# Patient Record
Sex: MALE | Race: BLACK OR AFRICAN AMERICAN | URBAN - METROPOLITAN AREA
[De-identification: names, ages, dates, MRNs, and addresses within clinical notes are randomized per-mention and may not be internally consistent; named-entity substitution may affect disease eponyms.]

---

## 2018-02-09 NOTE — PROGRESS NOTES
[facility]: JESSIKA NEGRO W  Acute Inpatient Rehab Preadmission Assessment    Patient Name: Adenike Morales        MRN: <[id number]>    : 1940  (78 y.o.)  Gender: male     Admitted from:   [facility]  [facility]   [x]Outside Admission - Location: [facility]                                 [x]Initial         []Updated    Date of admission to the acute hospital:  2018    Impairment group:  4.1 Nontraumatic Spinal Cord Dysfunction (cervical myelopathy)     Did patient have surgery? []No  [x]Yes: 18 and scheduled 18     Physicians: Tanya Valente MD    Risk for clinical complications:  DVT due to immobility, skin breakdown related to prolonged bedrest, neuromuscular difficulties, alterations and deficits to daily capabilities    Co-morbidities:  Chronic kidney disease stage III (believed to be diabetic nephropathy), diabetes mellitus type 2 controlled with complications, dyslipidemia, hypertension     Financial Information  Primary insurance:  [x]Medicare [] Medicare HMO  []Commercial insurance    []Medicaid   [] Medicaid HMO []Workers Compensation   []Personal Pay    Secondary Insurance:  []Medicare [] Medicare HMO  [x]Commercial insurance    []Medicaid   []Workers Compensation []None    Precautions:   []Cardiac Precautions  []Total hip precautions    [x] Cervical Spine Precautions  []Weight Bearing status:  [x]Safety Precautions/Concerns  []Visually impaired   []Hard of Hearing     Isolation Precautions:         []Yes  [x]No  If Yes:  [] Droplet  []Contact []Airborne     []VRE     []MRSA     []C-diff         [] TB       [] Other:        Physiatrist:  [x]         Patients Occupation: Retired    Reviewed Lab and Diagnostic reports from Current Admission: Yes    Patients Prior Functional  Level:   Left LE weakness at baseline since surgery in , L AFO.  Baseline Independent     History of current illness:  Retrieved from PM&R consult at [facility] [address] is a 69 year old male whose current [facility] [place] dates to 2/6/18. History notes that he was admitted on that date from home. Mr. Fabio Moody is being seen at the diagnosis of Cervical Myelopathy s/p C4-5, C5-6 ACDF on 2/6/18. TThe patient has a hx of Type II diabetes on oral medications with nephropathy with CKD, stage 3, hypertension, remote hx of possible stroke in 20s, without residual deficits, lumbar decompression surgery for LLE and foot drop in 2014 with Dr. Blanka Cardoza in [place], [place], peripheral neuropathy diagnosed by EMG in November 2016, spinal stenosis of lumbar region with neurogenic claudication, and cervical spondylosis with myelopathy who was seen in outpatient clinic. Work up included, \"cervical x-rays, mild DDD, lumbar xrays shows him leaning to the left, with multilevel DDD, no instability. Cervical MRI -shows C4-5, C5-6 severe stenosis. Lumbar MRI shows severe stenosis at L2-3. \" Trial of conservative treatments without relief, so surgery was recommended. He was admitted for planned surgery and on 2/6/18 underwent C4-5, C5-6 ACDF for cervical myelopathy. Post-op he reported \"mild odynophagia, but is swallowing thin liquids, solid food, and pills without much difficulty. \"    Prior to hospitalization, he relates a daily activity level that was fully independent at the community level with assistive devices, left AFO and cane. He lives with spouse in one level home with 4-5 steps to enter. Current functional status for upper extremity ADLs:    Feeding: Set up  Grooming: Set up  Bathing: Min  Dressing: Min    Current functional status for lower extremity ADLs:    Bathing: Max  Dressing: Max    Current functional status for bed, chair, wheelchair transfers:   Rolling: Min  Supine to Sit: Min  Scooting: CGA  Sit to Stand: CGA  Functional Mobility: Min with Rolling Walker     Current functional status for toileting:     Moderate assist (urinary incontinence)   Toilet transfer: CGA Current functional status for locomotion:    PT eval states: \"Requiring Min to Max A for functional tasks. Marching in place and unable to progress to full ambulation trail. \" Will need gait/ambulation evaluation upon arrival to ARU. Current functional status for comprehension: [facility]     Current functional status for expression: WFL     Current functional status for social interaction: [facility]     Current functional status for problem solving:  [facility]     Current functional status for memory: [facility]    Current Deficits R/T Impairment: Impairments to neuromuscular, swallowing, strength, endurance, adl's and self-care. Alterations and deficits to daily capabilities. Required Therapy:   [x] Physical Therapy  [x] Occupational Therapy   [x] Speech Therapy (swallowing difficulties)     Additional Services:  [x]   [x] Recreational Therapy  [x] Nutrition             [] Prosthetist/Orthotist  [] Dialysis  [] Other:     Rehab Justification:  Needs 3 hrs therapy per day or 15 hours per week:  Yes  Identified Rehab Nursing needs: Yes  Intense Interdisciplinary need:  Yes  Need for 24 hr physician supervision:  Yes  Measurable improved quality of life:  Yes  Willingness to participate:  Yes  Medical Necessity:  Yes  Patient able to tolerate care proposed:  Yes    Expected Discharge Destination/Functional Level:  Modified Independent      Expected length of time to achieve that level of improvement: 2 weeks  Expected Post Discharge Treatments: Home with Outpatient Therapy, 2nd surgery scheduled 2/28/18     Other information relevant to the care needs:  Patient requires 24 hour nursing and physician monitoring for DVT prophylaxis, skin breakdown, cervical spin precautions, safety, neuromuscular retraining, infection monitoring, pain management, and patient and family education. Acute Inpatient Rehabilitation Disclosure Statement provided to patient. Patient verbalized understanding.   Copy placed on patient's light chart. I have reviewed and concur with the findings and results of the pre-admission screening assessment completed by the Inpatient Rehabilitation Admissions Coordinator.

## 2018-02-11 ENCOUNTER — HOSPITAL ENCOUNTER (INPATIENT)
Age: 78
LOS: 10 days | Discharge: HOME | DRG: 949 | End: 2018-02-21
Attending: PHYSICAL MEDICINE & REHABILITATION | Admitting: PHYSICAL MEDICINE & REHABILITATION
Payer: MEDICARE

## 2018-02-11 DIAGNOSIS — Z98.890 H/O EXCISION OF LAMINA OF CERVICAL VERTEBRA FOR DECOMPRESSION OF SPINAL CORD: ICD-10-CM

## 2018-02-11 DIAGNOSIS — G95.9 CERVICAL MYELOPATHY (HCC): Primary | ICD-10-CM

## 2018-02-11 PROCEDURE — 6370000000 HC RX 637 (ALT 250 FOR IP): Performed by: PHYSICAL MEDICINE & REHABILITATION

## 2018-02-11 PROCEDURE — 1180000000 HC REHAB R&B

## 2018-02-11 RX ORDER — HYDRALAZINE HYDROCHLORIDE 100 MG/1
100 TABLET, FILM COATED ORAL 3 TIMES DAILY
Status: ON HOLD | COMMUNITY
End: 2018-02-20 | Stop reason: HOSPADM

## 2018-02-11 RX ORDER — CLONIDINE HYDROCHLORIDE 0.1 MG/1
0.1 TABLET ORAL 3 TIMES DAILY
Status: DISCONTINUED | OUTPATIENT
Start: 2018-02-11 | End: 2018-02-21 | Stop reason: HOSPADM

## 2018-02-11 RX ORDER — ACETAMINOPHEN 325 MG/1
650 TABLET ORAL EVERY 4 HOURS PRN
Status: DISCONTINUED | OUTPATIENT
Start: 2018-02-11 | End: 2018-02-21 | Stop reason: HOSPADM

## 2018-02-11 RX ORDER — LISINOPRIL 20 MG/1
20 TABLET ORAL 2 TIMES DAILY
Status: ON HOLD | COMMUNITY
End: 2018-02-20 | Stop reason: HOSPADM

## 2018-02-11 RX ORDER — LISINOPRIL 20 MG/1
20 TABLET ORAL 2 TIMES DAILY
Status: DISCONTINUED | OUTPATIENT
Start: 2018-02-11 | End: 2018-02-21 | Stop reason: HOSPADM

## 2018-02-11 RX ORDER — ASPIRIN 81 MG/1
81 TABLET, CHEWABLE ORAL DAILY
Status: DISCONTINUED | OUTPATIENT
Start: 2018-02-12 | End: 2018-02-21 | Stop reason: HOSPADM

## 2018-02-11 RX ORDER — SENNA PLUS 8.6 MG/1
2 TABLET ORAL NIGHTLY
Status: DISCONTINUED | OUTPATIENT
Start: 2018-02-12 | End: 2018-02-21 | Stop reason: HOSPADM

## 2018-02-11 RX ORDER — EZETIMIBE 10 MG/1
10 TABLET ORAL DAILY
COMMUNITY

## 2018-02-11 RX ORDER — CARVEDILOL 25 MG/1
25 TABLET ORAL 2 TIMES DAILY WITH MEALS
Status: DISCONTINUED | OUTPATIENT
Start: 2018-02-11 | End: 2018-02-21 | Stop reason: HOSPADM

## 2018-02-11 RX ORDER — SENNA PLUS 8.6 MG/1
2 TABLET ORAL DAILY
Status: ON HOLD | COMMUNITY
End: 2018-05-22 | Stop reason: HOSPADM

## 2018-02-11 RX ORDER — HYDRALAZINE HYDROCHLORIDE 50 MG/1
100 TABLET, FILM COATED ORAL EVERY 8 HOURS SCHEDULED
Status: DISCONTINUED | OUTPATIENT
Start: 2018-02-11 | End: 2018-02-21 | Stop reason: HOSPADM

## 2018-02-11 RX ORDER — OXYCODONE HYDROCHLORIDE AND ACETAMINOPHEN 5; 325 MG/1; MG/1
1 TABLET ORAL EVERY 6 HOURS PRN
Status: DISCONTINUED | OUTPATIENT
Start: 2018-02-11 | End: 2018-02-21 | Stop reason: HOSPADM

## 2018-02-11 RX ORDER — DOCUSATE SODIUM 100 MG/1
100 CAPSULE, LIQUID FILLED ORAL 2 TIMES DAILY
Status: ON HOLD | COMMUNITY
End: 2018-02-20 | Stop reason: HOSPADM

## 2018-02-11 RX ORDER — NICOTINE POLACRILEX 4 MG
15 LOZENGE BUCCAL PRN
Status: DISCONTINUED | OUTPATIENT
Start: 2018-02-11 | End: 2018-02-21 | Stop reason: HOSPADM

## 2018-02-11 RX ORDER — FUROSEMIDE 40 MG/1
40 TABLET ORAL DAILY
Status: ON HOLD | COMMUNITY
End: 2018-02-20 | Stop reason: HOSPADM

## 2018-02-11 RX ORDER — DOCUSATE SODIUM 100 MG/1
100 CAPSULE, LIQUID FILLED ORAL 2 TIMES DAILY
Status: DISCONTINUED | OUTPATIENT
Start: 2018-02-12 | End: 2018-02-21 | Stop reason: HOSPADM

## 2018-02-11 RX ORDER — FUROSEMIDE 40 MG/1
40 TABLET ORAL DAILY
Status: DISCONTINUED | OUTPATIENT
Start: 2018-02-12 | End: 2018-02-21 | Stop reason: HOSPADM

## 2018-02-11 RX ORDER — M-VIT,TX,IRON,MINS/CALC/FOLIC 27MG-0.4MG
1 TABLET ORAL DAILY
COMMUNITY

## 2018-02-11 RX ORDER — ASCORBIC ACID 500 MG
1000 TABLET ORAL DAILY
Status: DISCONTINUED | OUTPATIENT
Start: 2018-02-12 | End: 2018-02-21 | Stop reason: HOSPADM

## 2018-02-11 RX ORDER — FEBUXOSTAT 40 MG/1
40 TABLET, FILM COATED ORAL DAILY
Status: DISCONTINUED | OUTPATIENT
Start: 2018-02-12 | End: 2018-02-21 | Stop reason: HOSPADM

## 2018-02-11 RX ORDER — ATORVASTATIN CALCIUM 40 MG/1
40 TABLET, FILM COATED ORAL NIGHTLY
Status: DISCONTINUED | OUTPATIENT
Start: 2018-02-11 | End: 2018-02-21 | Stop reason: HOSPADM

## 2018-02-11 RX ORDER — ATORVASTATIN CALCIUM 40 MG/1
40 TABLET, FILM COATED ORAL DAILY
Status: ON HOLD | COMMUNITY
End: 2018-02-20 | Stop reason: HOSPADM

## 2018-02-11 RX ORDER — ASCORBIC ACID 500 MG
1000 TABLET ORAL DAILY
COMMUNITY

## 2018-02-11 RX ORDER — CLONIDINE HYDROCHLORIDE 0.1 MG/1
0.1 TABLET ORAL 3 TIMES DAILY
Status: ON HOLD | COMMUNITY
End: 2018-02-20 | Stop reason: HOSPADM

## 2018-02-11 RX ORDER — FEBUXOSTAT 40 MG/1
40 TABLET, FILM COATED ORAL DAILY
Status: ON HOLD | COMMUNITY
End: 2018-02-20 | Stop reason: HOSPADM

## 2018-02-11 RX ORDER — M-VIT,TX,IRON,MINS/CALC/FOLIC 27MG-0.4MG
1 TABLET ORAL DAILY
Status: DISCONTINUED | OUTPATIENT
Start: 2018-02-12 | End: 2018-02-21 | Stop reason: HOSPADM

## 2018-02-11 RX ORDER — CARVEDILOL 25 MG/1
25 TABLET ORAL 2 TIMES DAILY
Status: ON HOLD | COMMUNITY
End: 2018-02-20 | Stop reason: HOSPADM

## 2018-02-11 RX ORDER — HEPARIN SODIUM 5000 [USP'U]/ML
5000 INJECTION, SOLUTION INTRAVENOUS; SUBCUTANEOUS 2 TIMES DAILY
Status: DISCONTINUED | OUTPATIENT
Start: 2018-02-12 | End: 2018-02-21 | Stop reason: HOSPADM

## 2018-02-11 RX ORDER — HEPARIN SODIUM 5000 [USP'U]/ML
5000 INJECTION, SOLUTION INTRAVENOUS; SUBCUTANEOUS 2 TIMES DAILY
Status: ON HOLD | COMMUNITY
End: 2018-02-20 | Stop reason: HOSPADM

## 2018-02-11 RX ORDER — FAMOTIDINE 20 MG/1
20 TABLET, FILM COATED ORAL 2 TIMES DAILY
Status: DISCONTINUED | OUTPATIENT
Start: 2018-02-11 | End: 2018-02-15

## 2018-02-11 RX ORDER — DEXTROSE MONOHYDRATE 25 G/50ML
12.5 INJECTION, SOLUTION INTRAVENOUS PRN
Status: DISCONTINUED | OUTPATIENT
Start: 2018-02-11 | End: 2018-02-21 | Stop reason: HOSPADM

## 2018-02-11 RX ORDER — OXYCODONE HYDROCHLORIDE AND ACETAMINOPHEN 5; 325 MG/1; MG/1
1 TABLET ORAL EVERY 6 HOURS PRN
Status: ON HOLD | COMMUNITY
End: 2018-02-20 | Stop reason: HOSPADM

## 2018-02-11 RX ORDER — DEXTROSE MONOHYDRATE 50 MG/ML
100 INJECTION, SOLUTION INTRAVENOUS PRN
Status: DISCONTINUED | OUTPATIENT
Start: 2018-02-11 | End: 2018-02-21 | Stop reason: HOSPADM

## 2018-02-11 RX ADMIN — CLONIDINE HYDROCHLORIDE 0.1 MG: 0.1 TABLET ORAL at 21:10

## 2018-02-11 RX ADMIN — HYDRALAZINE HYDROCHLORIDE 100 MG: 50 TABLET, FILM COATED ORAL at 21:11

## 2018-02-11 RX ADMIN — FAMOTIDINE 20 MG: 20 TABLET, FILM COATED ORAL at 21:09

## 2018-02-11 RX ADMIN — LISINOPRIL 20 MG: 20 TABLET ORAL at 21:09

## 2018-02-11 RX ADMIN — CARVEDILOL 25 MG: 25 TABLET, FILM COATED ORAL at 21:13

## 2018-02-11 RX ADMIN — OXYCODONE HYDROCHLORIDE AND ACETAMINOPHEN 1 TABLET: 5; 325 TABLET ORAL at 21:09

## 2018-02-11 RX ADMIN — ATORVASTATIN CALCIUM 40 MG: 40 TABLET, FILM COATED ORAL at 21:09

## 2018-02-11 ASSESSMENT — PAIN DESCRIPTION - FREQUENCY: FREQUENCY: INTERMITTENT

## 2018-02-11 ASSESSMENT — PAIN DESCRIPTION - LOCATION: LOCATION: NECK

## 2018-02-11 ASSESSMENT — PAIN SCALES - GENERAL
PAINLEVEL_OUTOF10: 5
PAINLEVEL_OUTOF10: 5
PAINLEVEL_OUTOF10: 3

## 2018-02-11 ASSESSMENT — PAIN DESCRIPTION - PROGRESSION: CLINICAL_PROGRESSION: NOT CHANGED

## 2018-02-11 ASSESSMENT — PAIN DESCRIPTION - PAIN TYPE: TYPE: ACUTE PAIN;SURGICAL PAIN

## 2018-02-11 ASSESSMENT — PAIN DESCRIPTION - DESCRIPTORS: DESCRIPTORS: ACHING;DISCOMFORT

## 2018-02-11 ASSESSMENT — PAIN DESCRIPTION - ORIENTATION: ORIENTATION: ANTERIOR

## 2018-02-11 ASSESSMENT — PAIN DESCRIPTION - ONSET: ONSET: ON-GOING

## 2018-02-12 LAB
ALBUMIN SERPL-MCNC: 3.3 G/DL (ref 3.5–5.2)
ALBUMIN/GLOBULIN RATIO: ABNORMAL (ref 1–2.5)
ALP BLD-CCNC: 39 U/L (ref 40–129)
ALT SERPL-CCNC: 6 U/L (ref 5–41)
ANION GAP SERPL CALCULATED.3IONS-SCNC: 9 MMOL/L (ref 9–17)
AST SERPL-CCNC: 10 U/L
BILIRUB SERPL-MCNC: 0.25 MG/DL (ref 0.3–1.2)
BUN BLDV-MCNC: 29 MG/DL (ref 8–23)
BUN/CREAT BLD: ABNORMAL (ref 9–20)
CALCIUM SERPL-MCNC: 8.9 MG/DL (ref 8.6–10.4)
CHLORIDE BLD-SCNC: 104 MMOL/L (ref 98–107)
CO2: 31 MMOL/L (ref 20–31)
CREAT SERPL-MCNC: 1.73 MG/DL (ref 0.7–1.2)
GFR AFRICAN AMERICAN: 47 ML/MIN
GFR NON-AFRICAN AMERICAN: 39 ML/MIN
GFR SERPL CREATININE-BSD FRML MDRD: ABNORMAL ML/MIN/{1.73_M2}
GFR SERPL CREATININE-BSD FRML MDRD: ABNORMAL ML/MIN/{1.73_M2}
GLUCOSE BLD-MCNC: 105 MG/DL (ref 75–110)
GLUCOSE BLD-MCNC: 149 MG/DL (ref 75–110)
GLUCOSE BLD-MCNC: 152 MG/DL (ref 75–110)
GLUCOSE BLD-MCNC: 152 MG/DL (ref 75–110)
GLUCOSE BLD-MCNC: 156 MG/DL (ref 70–99)
POTASSIUM SERPL-SCNC: 3.6 MMOL/L (ref 3.7–5.3)
SODIUM BLD-SCNC: 144 MMOL/L (ref 135–144)
TOTAL PROTEIN: 6.3 G/DL (ref 6.4–8.3)

## 2018-02-12 PROCEDURE — 82947 ASSAY GLUCOSE BLOOD QUANT: CPT

## 2018-02-12 PROCEDURE — 36415 COLL VENOUS BLD VENIPUNCTURE: CPT

## 2018-02-12 PROCEDURE — 6370000000 HC RX 637 (ALT 250 FOR IP): Performed by: PHYSICAL MEDICINE & REHABILITATION

## 2018-02-12 PROCEDURE — 1180000000 HC REHAB R&B

## 2018-02-12 PROCEDURE — 99223 1ST HOSP IP/OBS HIGH 75: CPT | Performed by: PHYSICAL MEDICINE & REHABILITATION

## 2018-02-12 PROCEDURE — 97110 THERAPEUTIC EXERCISES: CPT

## 2018-02-12 PROCEDURE — 92610 EVALUATE SWALLOWING FUNCTION: CPT

## 2018-02-12 PROCEDURE — 80053 COMPREHEN METABOLIC PANEL: CPT

## 2018-02-12 PROCEDURE — 97116 GAIT TRAINING THERAPY: CPT

## 2018-02-12 PROCEDURE — 97162 PT EVAL MOD COMPLEX 30 MIN: CPT

## 2018-02-12 PROCEDURE — 97530 THERAPEUTIC ACTIVITIES: CPT

## 2018-02-12 PROCEDURE — 6360000002 HC RX W HCPCS: Performed by: PHYSICAL MEDICINE & REHABILITATION

## 2018-02-12 PROCEDURE — 97165 OT EVAL LOW COMPLEX 30 MIN: CPT

## 2018-02-12 PROCEDURE — 97535 SELF CARE MNGMENT TRAINING: CPT

## 2018-02-12 RX ORDER — POTASSIUM CHLORIDE 20 MEQ/1
20 TABLET, EXTENDED RELEASE ORAL ONCE
Status: COMPLETED | OUTPATIENT
Start: 2018-02-12 | End: 2018-02-12

## 2018-02-12 RX ADMIN — OXYCODONE HYDROCHLORIDE AND ACETAMINOPHEN 1000 MG: 500 TABLET ORAL at 07:37

## 2018-02-12 RX ADMIN — CARVEDILOL 25 MG: 25 TABLET, FILM COATED ORAL at 07:36

## 2018-02-12 RX ADMIN — SENNOSIDES 17.2 MG: 8.6 TABLET, FILM COATED ORAL at 21:07

## 2018-02-12 RX ADMIN — HEPARIN SODIUM 5000 UNITS: 5000 INJECTION, SOLUTION INTRAVENOUS; SUBCUTANEOUS at 07:38

## 2018-02-12 RX ADMIN — OXYCODONE HYDROCHLORIDE AND ACETAMINOPHEN 1 TABLET: 5; 325 TABLET ORAL at 06:16

## 2018-02-12 RX ADMIN — HYDRALAZINE HYDROCHLORIDE 100 MG: 50 TABLET, FILM COATED ORAL at 06:13

## 2018-02-12 RX ADMIN — HEPARIN SODIUM 5000 UNITS: 5000 INJECTION, SOLUTION INTRAVENOUS; SUBCUTANEOUS at 21:12

## 2018-02-12 RX ADMIN — CARVEDILOL 25 MG: 25 TABLET, FILM COATED ORAL at 18:08

## 2018-02-12 RX ADMIN — CLONIDINE HYDROCHLORIDE 0.1 MG: 0.1 TABLET ORAL at 15:22

## 2018-02-12 RX ADMIN — MULTIPLE VITAMINS W/ MINERALS TAB 1 TABLET: TAB at 07:36

## 2018-02-12 RX ADMIN — FAMOTIDINE 20 MG: 20 TABLET, FILM COATED ORAL at 21:07

## 2018-02-12 RX ADMIN — CLONIDINE HYDROCHLORIDE 0.1 MG: 0.1 TABLET ORAL at 07:37

## 2018-02-12 RX ADMIN — MAGNESIUM HYDROXIDE 30 ML: 400 SUSPENSION ORAL at 21:06

## 2018-02-12 RX ADMIN — OXYCODONE HYDROCHLORIDE AND ACETAMINOPHEN 1 TABLET: 5; 325 TABLET ORAL at 12:56

## 2018-02-12 RX ADMIN — ATORVASTATIN CALCIUM 40 MG: 40 TABLET, FILM COATED ORAL at 21:06

## 2018-02-12 RX ADMIN — INSULIN LISPRO 1 UNITS: 100 INJECTION, SOLUTION INTRAVENOUS; SUBCUTANEOUS at 21:09

## 2018-02-12 RX ADMIN — FAMOTIDINE 20 MG: 20 TABLET, FILM COATED ORAL at 07:36

## 2018-02-12 RX ADMIN — POTASSIUM CHLORIDE 20 MEQ: 20 TABLET, EXTENDED RELEASE ORAL at 18:09

## 2018-02-12 RX ADMIN — LISINOPRIL 20 MG: 20 TABLET ORAL at 21:06

## 2018-02-12 RX ADMIN — FEBUXOSTAT 40 MG: 40 TABLET ORAL at 07:37

## 2018-02-12 RX ADMIN — FUROSEMIDE 40 MG: 40 TABLET ORAL at 07:35

## 2018-02-12 RX ADMIN — LISINOPRIL 20 MG: 20 TABLET ORAL at 07:36

## 2018-02-12 RX ADMIN — CLONIDINE HYDROCHLORIDE 0.1 MG: 0.1 TABLET ORAL at 21:08

## 2018-02-12 RX ADMIN — HYDRALAZINE HYDROCHLORIDE 100 MG: 50 TABLET, FILM COATED ORAL at 15:22

## 2018-02-12 RX ADMIN — ASPIRIN 81 MG 81 MG: 81 TABLET ORAL at 07:36

## 2018-02-12 RX ADMIN — DOCUSATE SODIUM 100 MG: 100 CAPSULE, LIQUID FILLED ORAL at 21:07

## 2018-02-12 RX ADMIN — HYDRALAZINE HYDROCHLORIDE 100 MG: 50 TABLET, FILM COATED ORAL at 22:29

## 2018-02-12 RX ADMIN — OXYCODONE HYDROCHLORIDE AND ACETAMINOPHEN 1 TABLET: 5; 325 TABLET ORAL at 19:24

## 2018-02-12 RX ADMIN — LINAGLIPTIN 5 MG: 5 TABLET, FILM COATED ORAL at 07:38

## 2018-02-12 ASSESSMENT — PAIN SCALES - GENERAL
PAINLEVEL_OUTOF10: 4
PAINLEVEL_OUTOF10: 5
PAINLEVEL_OUTOF10: 0
PAINLEVEL_OUTOF10: 4
PAINLEVEL_OUTOF10: 5
PAINLEVEL_OUTOF10: 0
PAINLEVEL_OUTOF10: 5
PAINLEVEL_OUTOF10: 2
PAINLEVEL_OUTOF10: 6
PAINLEVEL_OUTOF10: 4
PAINLEVEL_OUTOF10: 6

## 2018-02-12 ASSESSMENT — PAIN DESCRIPTION - PAIN TYPE
TYPE: ACUTE PAIN;SURGICAL PAIN
TYPE: SURGICAL PAIN

## 2018-02-12 ASSESSMENT — PAIN - FUNCTIONAL ASSESSMENT: PAIN_FUNCTIONAL_ASSESSMENT: 0-10

## 2018-02-12 ASSESSMENT — PAIN DESCRIPTION - LOCATION
LOCATION: NECK
LOCATION: NECK
LOCATION: NECK;SHOULDER

## 2018-02-12 ASSESSMENT — PAIN DESCRIPTION - FREQUENCY: FREQUENCY: INTERMITTENT

## 2018-02-12 ASSESSMENT — PAIN DESCRIPTION - DESCRIPTORS
DESCRIPTORS: ACHING;DISCOMFORT
DESCRIPTORS: ACHING;DISCOMFORT

## 2018-02-12 ASSESSMENT — PAIN DESCRIPTION - ORIENTATION
ORIENTATION: ANTERIOR;RIGHT
ORIENTATION: ANTERIOR
ORIENTATION: ANTERIOR;RIGHT;LEFT

## 2018-02-12 ASSESSMENT — PAIN DESCRIPTION - PROGRESSION: CLINICAL_PROGRESSION: NOT CHANGED

## 2018-02-12 ASSESSMENT — PAIN DESCRIPTION - ONSET: ONSET: ON-GOING

## 2018-02-12 NOTE — PROGRESS NOTES
[facility]: JESSIKA ARAUJO   ACUTE REHABILITATION OCCUPATIONAL THERAPY  DAILY NOTE    Date: 18  Patient Name: Shakila Perez      Room: 7111/9042-41    MRN: [id number]   : 1940  (69 y.o.)  Gender: male      Diagnosis: Cervical Myelopathy s/pC4-C5, C5-6 ACDF on 18  Additional Pertinent Hx: h/o CVA    Restrictions  Restrictions/Precautions: General Precautions  Other position/activity restrictions: 18 underwent C4-5, C5-6 ACDF for cervical myelopathy  Required Braces or Orthoses  Left Lower Extremity Brace: [employer]  Position Activity Restriction  Other position/activity restrictions: 18 underwent C4-5, C5-6 ACDF for cervical myelopathy     Subjective  Comments: Pt pleasant and cooperative. Patient Currently in Pain: Yes  Pain Level: 6 (pt reports taking a pain pill recently )  Pain Location: Neck; Shoulder  Pain Orientation: Anterior;Right;Left  Restrictions/Precautions: General Precautions        Objective  Cognition  Overall Cognitive Status: WNL  Perception  Overall Perceptual Status: WFL  Balance  Sitting Balance: Independent  Standing Balance: Stand by assistance  Transfers  Sit to stand: Minimal assistance  Stand to sit: Contact guard assistance (VC's for hand placement)  Standing Balance  Time: 8-9 min x 2   Activity: standing at tabletop with 1 UE support while crossing midline to complete large ROM arch. Task faciliated pt to complete fully upright stand while addressing standing tolerance and BUE ROM (shoulders did not go past 90 degrees for comfort)  Sit to stand: Minimal assistance  Stand to sit: Contact guard assistance (VC's for hand placement)     OT FIM:   Dressing-Lower: 5 - Requires setup/supervision/cues and/or staff applies TEDS/prosthesis/brace only (doffed/donned B shoes/socks/L AFO)       Assessment  Performance deficits / Impairments: Decreased functional mobility ; Decreased ADL status; Decreased balance;Decreased endurance;Decreased high-level

## 2018-02-12 NOTE — PATIENT CARE CONFERENCE
[address]   ACUTE REHABILITATION  TEAM CONFERENCE NOTE  Date: 2/15/18  Patient Name: Daysi Najera       Room: 4405/0585-41  MRN: [id number]       : 1940  (69 y.o.)     Gender: male      Diagnosis: 18 underwent C4-5, C5-6 ACDF for cervical myelopathy    NURSING  FIMS:  Bladder: 7 - Patient urinates in toilet independently  Bladder Level of Assistance: 7- Complete [place]  Bladder Frequency of Accidents: 7 - No accidents  Bowel: 6 - Uses toilet independently with device or oral medication(s)  Bowel Level of Assistance: 6- Modified [place]  Bowel Frequency of Accidents: 6 - No accidents: uses device   Bladder  Continent  Bowel   Continent  Intervention    Bowel Program    Wounds/Incisions/Ulcers: Incision healing well  Medication Education Program: Patient able to manage medications and being educated by nursing  Pain: Patient's pain is currently controlled with -      Fall Risk:  Falling star program initiated    PHYSICAL THERAPY   Bed mobility  Bridging: Supervision  Scooting: Supervision  Bed Mobility  Bridging: Supervision  Supine to Sit: Supervision  Sit to Supine: Supervision  Scooting: Supervision      Transfers:  Sit to Stand: Contact guard assistance (vc's for hand placement)  Stand to sit: Contact guard assistance (cues for hand placement)  Stand Pivot Transfers: Contact guard assistance (w/RW)    WB Status: FWB  Ambulation 1  Surface: level tile  Device: Rolling Walker  Other Apparatus: AFO; Left  Assistance: Contact guard assistance  Quality of Gait: slow gait, fwd flexed posture, NBOS, tends to push RW too far fwd  Distance: 150 ft x 2  Comments: VC's to stay up inside the RW for safety    Stairs  # Steps : 5  Stairs Height: 4\" (6inch also)  Rails: Bilateral  Device: No Device  Assistance: Contact guard assistance  Comment: step to pattern on steps    FIMS:  Bed, Chair, Wheel Chair: 4 - Requires steadying assistance only <25% assist  and/or requires assist with one transfer using RW and toileting tasks with good . Short term goal 3: Pt. will demo. Mod.I with BADL's. Short term goal 4: Pt. will tolerate 30+ min. functional activities/therapeutic ex. to promote increased indep.with ADL's and mobility. Short term goal 5: Pt. will demo. SBA with light homemgmt tasks. Short term goal 6: Pt. will tolerate 8-10 min. functional standing activities to promote increased indep.with ADL's and mobility. SPEECH THERAPY  Tolerating regular solids, thin liquids. No ST recommended  Short Term Goal: n/a    RECREATIONAL THERAPY  Comment/Participation: Participating in unit program      NUTRITION  Weight: 213 lb (96.6 kg) / Body mass index is 31.45 kg/m². Diet Rx: CHO Control. Fair PO intake. Ensure High Protein provided BID to supplement intake. Although pt passesd bedside swallow study, he states some difficulty swallowing. Ground meats will be provided if requested. Ref. Range 2/15/2018 06:24 2/15/2018 07:01 2/15/2018 10:46   POC Glucose Latest Ref Range: 75 - 110 mg/dL 153 (H)  194 (H)   Please see nutrition note for details.     SOCIAL WORK ASSESSMENT  Assessment: with wife  Pre-Admission Status:  Lives With: Spouse  Type of Home: House  Home Layout: One level  Home Access: Stairs to enter with rails  Entrance Stairs - Number of Steps: 3  Entrance Stairs - Rails: Left  Bathroom Shower/Tub: Tub/Shower unit, Shower chair with back  Bathroom Toilet: Handicap height  Bathroom Equipment: Grab bars in shower  Home Equipment: Cane, Rolling walker, 4 wheeled walker  ADL Assistance: [address]: Needs assistance (shares with wife)  Homemaking Responsibilities: Yes  Meal Prep Responsibility: No (spouse does)  Laundry Responsibility: No (Spouse does)  Cleaning Responsibility: Secondary  Shopping Responsibility: Secondary  Ambulation Assistance: Independent (uses single cane and AFO left LE)  Transfer Assistance: Independent  Active : Yes  Mode of Transportation: Car  IADL Comments: Pt uses L AFO at all times, x years  Additional Comments: Limited distances with ambulation prior to surgery, used cane primarily, pushed cart or scooter in stores     Family Education: Need to make contact with family to initiate education    Risk for Readmission: Low <10   Readmission Risk              Readmission Risk:        9.5       Age 72 or Greater:  1    Admitted from SNF or Requires Paid or Family Care:  0    Currently has CHF,COPD,ARF,CRI,or is on dialysis:  0    Takes more than 5 Prescription Medications:  4    Takes Digoxin,Insulin,Anticoagulants,Narcotics or ASA/Plavix:  [address] in Past 12 Months:  0    On Disability:  0    Patient Considers own Health:  2.5        Critical Items:     Problem / Barrier Intervention / Plan  Results   Altered ability to care for self  Training in use of devices and modified techniques to increase safety and independence in self care tasks    Impaired mobility There exer; HEP; gait/stair training; pt educ                               Functional FIM Gain  Admission Score:  90  Progress:  TBD  Goal:  117   `  Discharge Plan   Estimated Discharge Date: 2/21/18  Overnight or Day Pass: No  Factors facilitating achievement of predicted outcomes: Family support, Motivated, Cooperative and Good insight into deficits  Barriers to the achievement of predicted outcomes: Pain, Anxiety, Upper extremity weakness, Lower extremity weakness, Medical complications, Skin Care and Medication managment    Functional Goals at discharge:  Home with family Modified independence  Discharge therapy goals:  PT: Long term goals  Time Frame for Long term goals : 10 days  Long term goal 1: Pt amb 300 ft with rw on uneven surfaces modified indep with proper postural awareness  Long term goal 2: Pt negotiate 12 stairs with left rail modified indep for community assess  OT:Long term goals  Time Frame for Long term goals : By discharge,   Long term goal 1:

## 2018-02-12 NOTE — PROGRESS NOTES
balance  Short term goal 3: Pt amb 200 ft with rw, modified indep using AFO, demonstrating safe HARLEY throughout  Short term goal 4: Pt improve standing balance to good with B UE support  Short term goal 5: Pt negotiate 4 stairs with 1 rail and CGA  Long term goals  Time Frame for Long term goals : 10 days  Long term goal 1: Pt amb 300 ft with rw on uneven surfaces modified indep with proper postural awareness  Long term goal 2: Pt negotiate 12 stairs with left rail modified indep for community assess  Patient Goals   Patient goals :  To get stronger       Therapy Time   Individual Concurrent Group Co-treatment   Time In [address] [place] [address]         [facility] Out [facility] [address] Yaa Hernandez

## 2018-02-12 NOTE — PROGRESS NOTES
Physical Therapy  [address]  Acute Rehabilitation Physical Therapy Progress Note    Date: 18  Patient Name: Getachew Cruz       Room: 87/4370-67  MRN: [id number]   Account: [de-identified]   : 1940  (78 y.o.) Gender: male           Past Medical History:  has a past medical history of Cerebral artery occlusion with cerebral infarction ([facility] [address] Chronic kidney disease; Diabetes mellitus ([facility] [address] Hyperlipidemia; Hypertension; and Movement disorder. Past Surgical History:   has a past surgical history that includes eye surgery (); hernia repair; Dilatation, esophagus (); back surgery (2018); and back surgery (). Overall Orientation Status: Within Normal Limits  Restrictions/Precautions  Restrictions/Precautions: General Precautions  Required Braces or Orthoses?: No  Position Activity Restriction  Other position/activity restrictions: 18 underwent C4-5, C5-6 ACDF for cervical myelopathy    Subjective: Pt reports he has neuropathy in (B) feet, partially from diabeties and partially from lumbar spine. Vital Signs  Patient Currently in Pain: Denies                   Bed Mobility:        Transfers:  Sit to Stand: Contact guard assistance (vc's for hand placement)  Stand to sit: Contact guard assistance (cues for hand placement)     Stand Pivot Transfers: Contact guard assistance (w/RW)           Ambulation 1  Surface: level tile  Device: Rolling Walker  Other Apparatus: AFO; Left  Assistance: Contact guard assistance  Quality of Gait: slow gait, fwd flexed posture, NBOS, tends to push RW too far fwd  Distance: 140ft  Comments: VC's to stay up inside the RW for safety        Stairs/Curb  Stairs?: Yes  Stairs  # Steps : 3  Stairs Height: 4\"  Rails: Bilateral  Assistance: Contact guard assistance  Comment: step to pattern on steps                                                    FIMS:      Transfers  Bed, Chair, Wheel Chair: 4 - Requires steadying assistance only

## 2018-02-12 NOTE — H&P
Physical Medicine & Rehabilitation History and Physical  [facility] Acute Rehabilitation Unit     Primary care provider: Ranjeet Cisneros MD     Chief Complaint and Reason for Rehabilitation Admission:   During ADL dysfunction secondary to cervical myelopathy, neurogenic claudication and neuropathy status post ACDF C4 through C6 once every 6 at [place]    History of Present Illness:  Marie Holland  is a 69 y.o. right-handed     male admitted to the [address] [place] [address] on 2/11/2018. He has a history of diabetes, diabetic neuropathy, history of back surgery done in 2014. After the back surgery he had improvement but then began with increasing weakness left greater than right. He uses an ankle-foot orthosis left lower extremity. He was referred to [place] for further evaluation. Questionable diabetic neuropathy, cervical myelopathy etc.  He underwent C4 5, C5 6 ACDF on Feb 6 2018 at [place]. Postop hypernatremia, anemia, mild renal insufficiency,patient make improvement and transferred to [facility] on February 11, 2018. Work-up included     Cervical xray Feb6: Post surgery  Status post discectomy and fusion with draining extending from C4 down to   C6 in good alignment.  No fracture.  Prevertebral soft tissue swelling is   present. Cervical MRI Nov 13 2017  *  Degenerative disc disease with midline disc protrusions at C4-5 and C5-6 resulting in moderate spinal stenoses and cord deformity. *  Uncovertebral degeneration and facet arthropathy result in significant narrowing of the bilateral C4-5 neural foramina. MRI lumbar spine November 11, 2017    Multilevel degenerative disc and facet disease with severe spinal stenosis at L2-3. *  Significant narrowing of the bilateral L3-4 and bilateral L4-5 neural foramina. Treatment included surgery and PT/OT.        He is currently requiring assistance for self-care activities Procedure Laterality Date    BACK [facility]06/2018    [facility]    BACK SURGERY  2014    [facility]  Lumbar spine ?  L5-S1    DILATATION, ESOPHAGUS  2006    sugical repair of \"antral ulcer\"    EYE SURGERY  2016    complex    HERNIA REPAIR      [id number] unspecified site       Allergies:    Allopurinol    Medications   Scheduled Meds:   heparin (porcine)  5,000 Units Subcutaneous BID    insulin lispro  0-6 Units Subcutaneous TID WC    insulin lispro  0-3 Units Subcutaneous Nightly    docusate sodium  100 mg Oral BID    senna  2 tablet Oral Nightly    linagliptin  5 mg Oral Daily    febuxostat  40 mg Oral Daily    hydrALAZINE  100 mg Oral 3 times per day    therapeutic multivitamin-minerals  1 tablet Oral Daily    atorvastatin  40 mg Oral Nightly    cloNIDine  0.1 mg Oral TID    carvedilol  25 mg Oral BID WC    lisinopril  20 mg Oral BID    aspirin  81 mg Oral Daily    vitamin C  1,000 mg Oral Daily    furosemide  40 mg Oral Daily    famotidine  20 mg Oral BID    [START ON 2/16/2018] cloNIDine  1 patch Transdermal Weekly     Continuous Infusions:   dextrose       PRN Meds:.glucose, dextrose, glucagon (rDNA), dextrose, oxyCODONE-acetaminophen, acetaminophen, magnesium hydroxide     Diagnostics:     Recent Results (from the past 24 hour(s))   POC Glucose Fingerstick    Collection Time: 02/12/18  7:01 AM   Result Value Ref Range    POC Glucose 152 (H) 75 - 110 mg/dL   Comprehensive Metabolic Panel w/ Reflex to MG    Collection Time: 02/12/18  7:12 AM   Result Value Ref Range    Glucose 156 (H) 70 - 99 mg/dL    BUN 29 (H) 8 - 23 mg/dL    CREATININE 1.73 (H) 0.70 - 1.20 mg/dL    Bun/Cre Ratio NOT REPORTED 9 - 20    Calcium 8.9 8.6 - 10.4 mg/dL    Sodium 144 135 - 144 mmol/L    Potassium 3.6 (L) 3.7 - 5.3 mmol/L    Chloride 104 98 - 107 mmol/L    CO2 31 20 - 31 mmol/L    Anion Gap 9 9 - 17 mmol/L    Alkaline Phosphatase 39 (L) 40 - 129 U/L    ALT 6 5 - 41 U/L    AST 10 <40 U/L LUNGS:  Clear to ausculation. HEART:  Regular. No murmurs of gallops. ABDOMEN:  Non-distended. Normal bowel sounds. No guarding, tenderness, mass. BACK:  No ulcers or deformity. EXTREMITIES:  PROM within functional limits. No calf tenderness, edema. Feet warm. NEUROMUSCULAR:  Sensation Neuropathy more in the LLE And lateral calves. , no extinction. Coordination smooth. Motor testing abnormal  weakness in LE more in the L s/p AFO- 4/5 LE with L > R, dorsiflexors 3+ to4 minus out of 5. Balance impaired. SKIN:  Intact. , incision clean anterior cervical      Principal Diagnosis/plan:  Ambulatory and ADL dysfunction secondary to Cervical spondylosis with myelopathy:   LE weakness and neuropathy due to DM, lumbar and cervical stenoses. He will benefit from intensive interdisciplinary therapies and rehab nursing care and is appropriate for inpatient rehabilitation. The post admission physician evaluation (ROB) is consistent with the pre-admission assessment. See above findings to reflect the elements required in the ROB. Patient's admitting condition is consistent with the findings of the preadmission assessment by the rehabilitation admissions coordinator. Other Diagnoses/plan:  1. Cher joint in ADL dysfunction second cervical myelopathy status post ACDF, diabetic neuropathy, etc.continue PT/OT/nursing to work on transfers, in relation, ADLs, steps, family training, etc. Anita Kline) medical monitoring for DVT, chronic kidney disease, diabetes, hypertension, gout, pain, etc.  Home goal in approximately 2 weeks at supervision to modified independent level with family. Prognosis good  2. Cervical myelopathymonitor incision  3. 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 Monitor for hypoglycemia  4. Nephropathy (CKD stage 3). Cont lasix and monitor for Na and cr , baseline appears to be around 1.8/1.9 creatinine, supplement potassium  5.   HTN -

## 2018-02-12 NOTE — PROGRESS NOTES
Speech Language Pathology  Facility/Department: [facility]   BEDSIDE SWALLOW EVALUATION    NAME: Maria C Duran  : 1940  MRN: [id number]    ADMISSION DATE: 2018  ADMITTING DIAGNOSIS: has Cervical myelopathy ([address] on his problem list.      Recent Chest Xray/CT of Chest:   - CXR- nothing acute, minor degenerative changes of spine    Date of Eval: 2018  Evaluating Therapist: Marlo Abrams    Current Diet level:  Current Diet : Regular  Current Liquid Diet : Thin      Primary Complaint   Pt. Underwent ACDF surgery on  of C4-5, 5-6 at [facility] [place] 2]    Pain:  Pain Assessment  Patient Currently in Pain: Yes  Pain Assessment: 0-10  Pain Level: 4  Pain Type: Surgical pain  Pain Location: Neck      Reason for Referral  Maria C Duran was referred for a bedside swallow evaluation to assess the efficiency of his swallow function, identify signs and symptoms of aspiration and make recommendations regarding safe dietary consistencies, effective compensatory strategies, and safe eating environment. Impression  Dysphagia Diagnosis: Swallow function appears grossly intact  Dysphagia Impression : Pt. demonstrated no overt s/s aspiration. He c/o soreness when swallowing, but this is common following his ACDF surgery. Pt. reports no feeling of food getting stuck in his throat. Dysphagia Outcome Severity Scale: Level 6: Within functional limits/Modified independence     Treatment Plan  Requires SLP Intervention: No             Recommended Diet and Intervention  Diet Solids Recommendation: Regular (as tolerated)  Liquid Consistency Recommendation: Thin          Compensatory Swallowing Strategies  Compensatory Swallowing Strategies: Eat/Feed slowly;Upright as possible for all oral intake;Small bites/sips      General  Behavior/Cognition: Alert; Cooperative  Respiratory Status: Room air  Follows Directions: Complex  Dentition: Adequate  Patient Positioning: Upright in chair  Baseline Vocal Quality:

## 2018-02-13 PROBLEM — I10 ESSENTIAL HYPERTENSION: Status: ACTIVE | Noted: 2018-02-13

## 2018-02-13 PROBLEM — G82.20 PARAPARESIS (HCC): Status: ACTIVE | Noted: 2018-02-13

## 2018-02-13 PROBLEM — E11.9 TYPE 2 DIABETES MELLITUS WITHOUT COMPLICATION (HCC): Status: ACTIVE | Noted: 2018-02-13

## 2018-02-13 PROBLEM — Z98.890 H/O EXCISION OF LAMINA OF CERVICAL VERTEBRA FOR DECOMPRESSION OF SPINAL CORD: Status: ACTIVE | Noted: 2018-02-13

## 2018-02-13 LAB
ESTIMATED AVERAGE GLUCOSE: 143 MG/DL
GLUCOSE BLD-MCNC: 116 MG/DL (ref 75–110)
GLUCOSE BLD-MCNC: 162 MG/DL (ref 75–110)
GLUCOSE BLD-MCNC: 166 MG/DL (ref 75–110)
GLUCOSE BLD-MCNC: 252 MG/DL (ref 75–110)
HBA1C MFR BLD: 6.6 % (ref 4–6)

## 2018-02-13 PROCEDURE — 97110 THERAPEUTIC EXERCISES: CPT

## 2018-02-13 PROCEDURE — 97530 THERAPEUTIC ACTIVITIES: CPT

## 2018-02-13 PROCEDURE — 82947 ASSAY GLUCOSE BLOOD QUANT: CPT

## 2018-02-13 PROCEDURE — 97535 SELF CARE MNGMENT TRAINING: CPT

## 2018-02-13 PROCEDURE — 99232 SBSQ HOSP IP/OBS MODERATE 35: CPT | Performed by: PHYSICAL MEDICINE & REHABILITATION

## 2018-02-13 PROCEDURE — 6360000002 HC RX W HCPCS: Performed by: PHYSICAL MEDICINE & REHABILITATION

## 2018-02-13 PROCEDURE — 97150 GROUP THERAPEUTIC PROCEDURES: CPT

## 2018-02-13 PROCEDURE — 6370000000 HC RX 637 (ALT 250 FOR IP): Performed by: PHYSICAL MEDICINE & REHABILITATION

## 2018-02-13 PROCEDURE — 36415 COLL VENOUS BLD VENIPUNCTURE: CPT

## 2018-02-13 PROCEDURE — 97116 GAIT TRAINING THERAPY: CPT

## 2018-02-13 PROCEDURE — 1180000000 HC REHAB R&B

## 2018-02-13 PROCEDURE — 97112 NEUROMUSCULAR REEDUCATION: CPT

## 2018-02-13 PROCEDURE — 99222 1ST HOSP IP/OBS MODERATE 55: CPT | Performed by: INTERNAL MEDICINE

## 2018-02-13 PROCEDURE — 83036 HEMOGLOBIN GLYCOSYLATED A1C: CPT

## 2018-02-13 RX ORDER — BISACODYL 10 MG
10 SUPPOSITORY, RECTAL RECTAL DAILY PRN
Status: DISCONTINUED | OUTPATIENT
Start: 2018-02-13 | End: 2018-02-21 | Stop reason: HOSPADM

## 2018-02-13 RX ADMIN — MULTIPLE VITAMINS W/ MINERALS TAB 1 TABLET: TAB at 08:13

## 2018-02-13 RX ADMIN — DOCUSATE SODIUM 100 MG: 100 CAPSULE, LIQUID FILLED ORAL at 19:29

## 2018-02-13 RX ADMIN — HYDRALAZINE HYDROCHLORIDE 100 MG: 50 TABLET, FILM COATED ORAL at 06:15

## 2018-02-13 RX ADMIN — SENNOSIDES 17.2 MG: 8.6 TABLET, FILM COATED ORAL at 19:29

## 2018-02-13 RX ADMIN — HYDRALAZINE HYDROCHLORIDE 100 MG: 50 TABLET, FILM COATED ORAL at 13:16

## 2018-02-13 RX ADMIN — FEBUXOSTAT 40 MG: 40 TABLET ORAL at 08:14

## 2018-02-13 RX ADMIN — DOCUSATE SODIUM 100 MG: 100 CAPSULE, LIQUID FILLED ORAL at 08:13

## 2018-02-13 RX ADMIN — CARVEDILOL 25 MG: 25 TABLET, FILM COATED ORAL at 08:13

## 2018-02-13 RX ADMIN — HYDRALAZINE HYDROCHLORIDE 100 MG: 50 TABLET, FILM COATED ORAL at 21:56

## 2018-02-13 RX ADMIN — CARVEDILOL 25 MG: 25 TABLET, FILM COATED ORAL at 17:38

## 2018-02-13 RX ADMIN — LISINOPRIL 20 MG: 20 TABLET ORAL at 20:22

## 2018-02-13 RX ADMIN — CLONIDINE HYDROCHLORIDE 0.1 MG: 0.1 TABLET ORAL at 20:23

## 2018-02-13 RX ADMIN — HEPARIN SODIUM 5000 UNITS: 5000 INJECTION, SOLUTION INTRAVENOUS; SUBCUTANEOUS at 22:00

## 2018-02-13 RX ADMIN — CLONIDINE HYDROCHLORIDE 0.1 MG: 0.1 TABLET ORAL at 08:14

## 2018-02-13 RX ADMIN — ASPIRIN 81 MG 81 MG: 81 TABLET ORAL at 08:13

## 2018-02-13 RX ADMIN — ATORVASTATIN CALCIUM 40 MG: 40 TABLET, FILM COATED ORAL at 20:22

## 2018-02-13 RX ADMIN — LINAGLIPTIN 5 MG: 5 TABLET, FILM COATED ORAL at 08:14

## 2018-02-13 RX ADMIN — FAMOTIDINE 20 MG: 20 TABLET, FILM COATED ORAL at 08:13

## 2018-02-13 RX ADMIN — OXYCODONE HYDROCHLORIDE AND ACETAMINOPHEN 1 TABLET: 5; 325 TABLET ORAL at 06:17

## 2018-02-13 RX ADMIN — FUROSEMIDE 40 MG: 40 TABLET ORAL at 08:14

## 2018-02-13 RX ADMIN — INSULIN LISPRO 2 UNITS: 100 INJECTION, SOLUTION INTRAVENOUS; SUBCUTANEOUS at 22:00

## 2018-02-13 RX ADMIN — HEPARIN SODIUM 5000 UNITS: 5000 INJECTION, SOLUTION INTRAVENOUS; SUBCUTANEOUS at 08:13

## 2018-02-13 RX ADMIN — OXYCODONE HYDROCHLORIDE AND ACETAMINOPHEN 1 TABLET: 5; 325 TABLET ORAL at 19:27

## 2018-02-13 RX ADMIN — OXYCODONE HYDROCHLORIDE AND ACETAMINOPHEN 1000 MG: 500 TABLET ORAL at 08:14

## 2018-02-13 RX ADMIN — BISACODYL 10 MG: 10 SUPPOSITORY RECTAL at 21:57

## 2018-02-13 RX ADMIN — OXYCODONE HYDROCHLORIDE AND ACETAMINOPHEN 1 TABLET: 5; 325 TABLET ORAL at 13:12

## 2018-02-13 RX ADMIN — CLONIDINE HYDROCHLORIDE 0.1 MG: 0.1 TABLET ORAL at 13:16

## 2018-02-13 RX ADMIN — FAMOTIDINE 20 MG: 20 TABLET, FILM COATED ORAL at 20:22

## 2018-02-13 RX ADMIN — LISINOPRIL 20 MG: 20 TABLET ORAL at 08:13

## 2018-02-13 ASSESSMENT — PAIN DESCRIPTION - LOCATION: LOCATION: NECK

## 2018-02-13 ASSESSMENT — PAIN SCALES - GENERAL
PAINLEVEL_OUTOF10: 5
PAINLEVEL_OUTOF10: 5
PAINLEVEL_OUTOF10: 3
PAINLEVEL_OUTOF10: 5
PAINLEVEL_OUTOF10: 5
PAINLEVEL_OUTOF10: 6
PAINLEVEL_OUTOF10: 6
PAINLEVEL_OUTOF10: 5

## 2018-02-13 ASSESSMENT — PAIN DESCRIPTION - PAIN TYPE: TYPE: ACUTE PAIN;SURGICAL PAIN

## 2018-02-13 ASSESSMENT — PAIN DESCRIPTION - ORIENTATION: ORIENTATION: ANTERIOR

## 2018-02-13 NOTE — PROGRESS NOTES
[address]   ACUTE REHABILITATION OCCUPATIONAL THERAPY  DAILY NOTE    Date: 18  Patient Name: Brady Lainez      Room: 7701/3647-92    MRN: [id number]   : 1940  (69 y.o.)  Gender: male      Diagnosis: Cervical Myelopathy s/pC4-C5, C5-6 ACDF on 18  Additional Pertinent Hx: h/o CVA    Restrictions  Restrictions/Precautions: General Precautions  Other position/activity restrictions: 18 underwent C4-5, C5-6 ACDF for cervical myelopathy  Required Braces or Orthoses  Left Lower Extremity Brace: Wilma Foot Orthotics  Position Activity Restriction  Other position/activity restrictions: 18 underwent C4-5, C5-6 ACDF for cervical myelopathy     Subjective  Comments: Pt. cooperative,pleasant and motivated. Patient Currently in Pain: Yes  Pain Level: 6  Pain Location: Neck  Pain Orientation: Anterior  Restrictions/Precautions: General Precautions          Objective  Cognition  Overall Cognitive Status: WNL  Perception  Overall Perceptual Status: WFL  Balance  Sitting Balance: Modified independent   Standing Balance: Contact guard assistance (cues for standing upright. )  Transfers  Sit to stand: Minimal assistance (CGA-MIN A)  Stand to sit: Minimal assistance (CGA-min A, VC's for hand placement)  Standing Balance  Time: AM: 1-2 min x 2 PM: 3-4 min, 2-3 min   Activity: AM: toileting task, static standing in RW for thigh CASIMIRO mgmt, pants up/down PM: loading clothing into washer, adjusting settings, toileting   Sit to stand: Minimal assistance (CGA-MIN A)  Stand to sit: Minimal assistance (CGA-min A, VC's for hand placement)        Type of ROM/Therapeutic Exercise  Type of ROM/Therapeutic Exercise: Cane/Wand (3#)  Comment: Pt completed 2x15 reps as instructed to increase overall strength and endurance for functional tasks. Pt verbalizes weight restriction of 5#.    Exercises  Scapular Protraction: x  Scapular Retraction: x  Shoulder Flexion: x  Shoulder Extension: x  Elbow Flexion: x  Elbow

## 2018-02-13 NOTE — CONSULTS
Number of Occurrences:   1    Turn or assist with turn every 2 hours if patient is unable to turn self. Remind patient to turn if necessary. Standing Status:   Standing     Number of Occurrences:   1    Inspect skin per unit guidelines     Standing Status:   Standing     Number of Occurrences:   1    Maintain HOB at the lowest elevation consistent with medical plan of care     Standing Status:   Standing     Number of Occurrences:   1    Full Code     Standing Status:   Standing     Number of Occurrences:   1    Consult to Internal Medicine     Standing Status:   Standing     Number of Occurrences:   1     Order Specific Question:   Reason for Consult? Answer:   med management    Inpatient consult to Social Work     Standing Status:   Standing     Number of Occurrences:   1     Order Specific Question:   Reason for Consult? Answer:   d/c planning    Inpatient consult to Recreation Therapy     Standing Status:   Standing     Number of Occurrences:   1     Order Specific Question:   Reason for Consult? Answer:   eval/treat    OT eval and treat     Standing Status:   Standing     Number of Occurrences:   1    PT evaluation and treat     Standing Status:   Standing     Number of Occurrences:   1    Initiate Oxygen Therapy Protocol     - If patient has any of the following conditions, initiate oxygen therapy: SpO2 less than 92%, Cyanosis, Chest Pain, Dyspnea, Home oxygen, or Altered level of consciousness    - If oxygen therapy initiated, enter the RT51 Nasal cannula oxygen order using Per Protocol order mode using the defaulted order parameters and titrate as specified in that order    - If oxygen therapy initiated, notify provider         Standing Status:   Standing     Number of Occurrences:   7    Speech language pathology evaluation     Standing Status:   Standing     Number of Occurrences:   1    POCT Glucose     Repeat blood glucose 15 minutes following intervention.   If blood glucose

## 2018-02-13 NOTE — PLAN OF CARE
Rests quietly with eyes closed after pain medication administration. Respirations easy and unlabored.  Appears free from distress.

## 2018-02-13 NOTE — PLAN OF CARE
Problem: Risk for Impaired Skin Integrity  Goal: Tissue integrity - skin and mucous membranes  Structural intactness and normal physiological function of skin and  mucous membranes. Outcome: Ongoing  No obvious new skin issues at this time. Problem: Falls - Risk of  Goal: Absence of falls  Outcome: Ongoing  No falls or injuries so far this shift. Call light in place. Problem: Pain:  Goal: Pain level will decrease  Pain level will decrease   Outcome: Ongoing  Pain controlled with PRN pain medications at this time.

## 2018-02-13 NOTE — PLAN OF CARE
Problem: Nutrition  Goal: Optimal nutrition therapy  Outcome: Ongoing  Nutrition Problem: Inadequate oral intake  Intervention: Food and/or Nutrient Delivery: Continue current diet, Start ONS  Nutritional Goals: PO intake to meet estimated needs

## 2018-02-14 LAB
ABSOLUTE EOS #: 0.3 K/UL (ref 0–0.4)
ABSOLUTE IMMATURE GRANULOCYTE: ABNORMAL K/UL (ref 0–0.3)
ABSOLUTE LYMPH #: 1.2 K/UL (ref 1–4.8)
ABSOLUTE MONO #: 0.6 K/UL (ref 0.1–1.3)
ALBUMIN SERPL-MCNC: 3.5 G/DL (ref 3.5–5.2)
ALBUMIN/GLOBULIN RATIO: ABNORMAL (ref 1–2.5)
ALP BLD-CCNC: 42 U/L (ref 40–129)
ALT SERPL-CCNC: 11 U/L (ref 5–41)
ANION GAP SERPL CALCULATED.3IONS-SCNC: 10 MMOL/L (ref 9–17)
AST SERPL-CCNC: 15 U/L
BASOPHILS # BLD: 1 % (ref 0–2)
BASOPHILS ABSOLUTE: 0 K/UL (ref 0–0.2)
BILIRUB SERPL-MCNC: 0.26 MG/DL (ref 0.3–1.2)
BUN BLDV-MCNC: 29 MG/DL (ref 8–23)
BUN/CREAT BLD: ABNORMAL (ref 9–20)
CALCIUM SERPL-MCNC: 8.9 MG/DL (ref 8.6–10.4)
CHLORIDE BLD-SCNC: 99 MMOL/L (ref 98–107)
CHOLESTEROL/HDL RATIO: 3.6
CHOLESTEROL: 101 MG/DL
CO2: 31 MMOL/L (ref 20–31)
CREAT SERPL-MCNC: 1.77 MG/DL (ref 0.7–1.2)
DIFFERENTIAL TYPE: ABNORMAL
EOSINOPHILS RELATIVE PERCENT: 4 % (ref 0–4)
FERRITIN: 151 UG/L (ref 30–400)
FOLATE: >20 NG/ML
GFR AFRICAN AMERICAN: 46 ML/MIN
GFR NON-AFRICAN AMERICAN: 38 ML/MIN
GFR SERPL CREATININE-BSD FRML MDRD: ABNORMAL ML/MIN/{1.73_M2}
GFR SERPL CREATININE-BSD FRML MDRD: ABNORMAL ML/MIN/{1.73_M2}
GLUCOSE BLD-MCNC: 121 MG/DL (ref 75–110)
GLUCOSE BLD-MCNC: 145 MG/DL (ref 75–110)
GLUCOSE BLD-MCNC: 149 MG/DL (ref 75–110)
GLUCOSE BLD-MCNC: 164 MG/DL (ref 70–99)
GLUCOSE BLD-MCNC: 169 MG/DL (ref 75–110)
HCT VFR BLD CALC: 31 % (ref 41–53)
HDLC SERPL-MCNC: 28 MG/DL
HEMOGLOBIN: 10.1 G/DL (ref 13.5–17.5)
IMMATURE GRANULOCYTES: ABNORMAL %
IRON SATURATION: 24 % (ref 20–55)
IRON: 45 UG/DL (ref 59–158)
LDL CHOLESTEROL: 43 MG/DL (ref 0–130)
LYMPHOCYTES # BLD: 19 % (ref 24–44)
MCH RBC QN AUTO: 31.2 PG (ref 26–34)
MCHC RBC AUTO-ENTMCNC: 32.7 G/DL (ref 31–37)
MCV RBC AUTO: 95.6 FL (ref 80–100)
MONOCYTES # BLD: 10 % (ref 1–7)
NRBC AUTOMATED: ABNORMAL PER 100 WBC
PDW BLD-RTO: 13.8 % (ref 11.5–14.9)
PLATELET # BLD: 204 K/UL (ref 150–450)
PLATELET ESTIMATE: ABNORMAL
PMV BLD AUTO: 8.4 FL (ref 6–12)
POTASSIUM SERPL-SCNC: 4 MMOL/L (ref 3.7–5.3)
RBC # BLD: 3.25 M/UL (ref 4.5–5.9)
RBC # BLD: ABNORMAL 10*6/UL
SEG NEUTROPHILS: 66 % (ref 36–66)
SEGMENTED NEUTROPHILS ABSOLUTE COUNT: 4.1 K/UL (ref 1.3–9.1)
SODIUM BLD-SCNC: 140 MMOL/L (ref 135–144)
TOTAL IRON BINDING CAPACITY: 185 UG/DL (ref 250–450)
TOTAL PROTEIN: 6.7 G/DL (ref 6.4–8.3)
TRIGL SERPL-MCNC: 152 MG/DL
UNSATURATED IRON BINDING CAPACITY: 140 UG/DL (ref 112–347)
VITAMIN B-12: 805 PG/ML (ref 232–1245)
VLDLC SERPL CALC-MCNC: ABNORMAL MG/DL (ref 1–30)
WBC # BLD: 6.2 K/UL (ref 3.5–11)
WBC # BLD: ABNORMAL 10*3/UL

## 2018-02-14 PROCEDURE — 82746 ASSAY OF FOLIC ACID SERUM: CPT

## 2018-02-14 PROCEDURE — 82728 ASSAY OF FERRITIN: CPT

## 2018-02-14 PROCEDURE — 97150 GROUP THERAPEUTIC PROCEDURES: CPT

## 2018-02-14 PROCEDURE — 97116 GAIT TRAINING THERAPY: CPT

## 2018-02-14 PROCEDURE — 6360000002 HC RX W HCPCS: Performed by: PHYSICAL MEDICINE & REHABILITATION

## 2018-02-14 PROCEDURE — 80053 COMPREHEN METABOLIC PANEL: CPT

## 2018-02-14 PROCEDURE — 82607 VITAMIN B-12: CPT

## 2018-02-14 PROCEDURE — 80061 LIPID PANEL: CPT

## 2018-02-14 PROCEDURE — 97535 SELF CARE MNGMENT TRAINING: CPT

## 2018-02-14 PROCEDURE — 99232 SBSQ HOSP IP/OBS MODERATE 35: CPT | Performed by: PHYSICAL MEDICINE & REHABILITATION

## 2018-02-14 PROCEDURE — 36415 COLL VENOUS BLD VENIPUNCTURE: CPT

## 2018-02-14 PROCEDURE — 97110 THERAPEUTIC EXERCISES: CPT

## 2018-02-14 PROCEDURE — 1180000000 HC REHAB R&B

## 2018-02-14 PROCEDURE — 85025 COMPLETE CBC W/AUTO DIFF WBC: CPT

## 2018-02-14 PROCEDURE — 82947 ASSAY GLUCOSE BLOOD QUANT: CPT

## 2018-02-14 PROCEDURE — 83550 IRON BINDING TEST: CPT

## 2018-02-14 PROCEDURE — 97112 NEUROMUSCULAR REEDUCATION: CPT

## 2018-02-14 PROCEDURE — 6370000000 HC RX 637 (ALT 250 FOR IP): Performed by: PHYSICAL MEDICINE & REHABILITATION

## 2018-02-14 PROCEDURE — 83540 ASSAY OF IRON: CPT

## 2018-02-14 RX ADMIN — OXYCODONE HYDROCHLORIDE AND ACETAMINOPHEN 1000 MG: 500 TABLET ORAL at 08:11

## 2018-02-14 RX ADMIN — MULTIPLE VITAMINS W/ MINERALS TAB 1 TABLET: TAB at 08:11

## 2018-02-14 RX ADMIN — FEBUXOSTAT 40 MG: 40 TABLET ORAL at 08:11

## 2018-02-14 RX ADMIN — FAMOTIDINE 20 MG: 20 TABLET, FILM COATED ORAL at 20:52

## 2018-02-14 RX ADMIN — CARVEDILOL 25 MG: 25 TABLET, FILM COATED ORAL at 08:11

## 2018-02-14 RX ADMIN — ATORVASTATIN CALCIUM 40 MG: 40 TABLET, FILM COATED ORAL at 20:51

## 2018-02-14 RX ADMIN — HEPARIN SODIUM 5000 UNITS: 5000 INJECTION, SOLUTION INTRAVENOUS; SUBCUTANEOUS at 08:11

## 2018-02-14 RX ADMIN — OXYCODONE HYDROCHLORIDE AND ACETAMINOPHEN 1 TABLET: 5; 325 TABLET ORAL at 17:06

## 2018-02-14 RX ADMIN — LISINOPRIL 20 MG: 20 TABLET ORAL at 08:11

## 2018-02-14 RX ADMIN — DOCUSATE SODIUM 100 MG: 100 CAPSULE, LIQUID FILLED ORAL at 20:52

## 2018-02-14 RX ADMIN — LINAGLIPTIN 5 MG: 5 TABLET, FILM COATED ORAL at 08:11

## 2018-02-14 RX ADMIN — CLONIDINE HYDROCHLORIDE 0.1 MG: 0.1 TABLET ORAL at 14:32

## 2018-02-14 RX ADMIN — HYDRALAZINE HYDROCHLORIDE 100 MG: 50 TABLET, FILM COATED ORAL at 22:23

## 2018-02-14 RX ADMIN — OXYCODONE HYDROCHLORIDE AND ACETAMINOPHEN 1 TABLET: 5; 325 TABLET ORAL at 11:04

## 2018-02-14 RX ADMIN — OXYCODONE HYDROCHLORIDE AND ACETAMINOPHEN 1 TABLET: 5; 325 TABLET ORAL at 04:17

## 2018-02-14 RX ADMIN — HYDRALAZINE HYDROCHLORIDE 100 MG: 50 TABLET, FILM COATED ORAL at 05:57

## 2018-02-14 RX ADMIN — INSULIN LISPRO 1 UNITS: 100 INJECTION, SOLUTION INTRAVENOUS; SUBCUTANEOUS at 08:11

## 2018-02-14 RX ADMIN — ASPIRIN 81 MG 81 MG: 81 TABLET ORAL at 08:11

## 2018-02-14 RX ADMIN — CLONIDINE HYDROCHLORIDE 0.1 MG: 0.1 TABLET ORAL at 20:53

## 2018-02-14 RX ADMIN — CARVEDILOL 25 MG: 25 TABLET, FILM COATED ORAL at 17:13

## 2018-02-14 RX ADMIN — HEPARIN SODIUM 5000 UNITS: 5000 INJECTION, SOLUTION INTRAVENOUS; SUBCUTANEOUS at 20:54

## 2018-02-14 RX ADMIN — FUROSEMIDE 40 MG: 40 TABLET ORAL at 08:11

## 2018-02-14 RX ADMIN — OXYCODONE HYDROCHLORIDE AND ACETAMINOPHEN 1 TABLET: 5; 325 TABLET ORAL at 23:09

## 2018-02-14 RX ADMIN — LISINOPRIL 20 MG: 20 TABLET ORAL at 20:51

## 2018-02-14 RX ADMIN — HYDRALAZINE HYDROCHLORIDE 100 MG: 50 TABLET, FILM COATED ORAL at 14:31

## 2018-02-14 RX ADMIN — SENNOSIDES 17.2 MG: 8.6 TABLET, FILM COATED ORAL at 20:52

## 2018-02-14 RX ADMIN — MAGNESIUM HYDROXIDE 30 ML: 400 SUSPENSION ORAL at 20:50

## 2018-02-14 RX ADMIN — INSULIN LISPRO 1 UNITS: 100 INJECTION, SOLUTION INTRAVENOUS; SUBCUTANEOUS at 20:55

## 2018-02-14 RX ADMIN — CLONIDINE HYDROCHLORIDE 0.1 MG: 0.1 TABLET ORAL at 08:11

## 2018-02-14 RX ADMIN — FAMOTIDINE 20 MG: 20 TABLET, FILM COATED ORAL at 08:11

## 2018-02-14 ASSESSMENT — PAIN SCALES - GENERAL
PAINLEVEL_OUTOF10: 5
PAINLEVEL_OUTOF10: 4
PAINLEVEL_OUTOF10: 7
PAINLEVEL_OUTOF10: 6
PAINLEVEL_OUTOF10: 5
PAINLEVEL_OUTOF10: 4
PAINLEVEL_OUTOF10: 6
PAINLEVEL_OUTOF10: 4
PAINLEVEL_OUTOF10: 5

## 2018-02-14 ASSESSMENT — PAIN DESCRIPTION - ONSET: ONSET: ON-GOING

## 2018-02-14 ASSESSMENT — PAIN DESCRIPTION - FREQUENCY: FREQUENCY: INTERMITTENT

## 2018-02-14 ASSESSMENT — PAIN DESCRIPTION - ORIENTATION: ORIENTATION: ANTERIOR;RIGHT

## 2018-02-14 ASSESSMENT — PAIN DESCRIPTION - LOCATION: LOCATION: NECK

## 2018-02-14 ASSESSMENT — PAIN DESCRIPTION - PAIN TYPE: TYPE: ACUTE PAIN;SURGICAL PAIN

## 2018-02-14 ASSESSMENT — PAIN DESCRIPTION - PROGRESSION: CLINICAL_PROGRESSION: NOT CHANGED

## 2018-02-14 ASSESSMENT — PAIN DESCRIPTION - DESCRIPTORS: DESCRIPTORS: ACHING;DISCOMFORT

## 2018-02-14 NOTE — PROGRESS NOTES
assistance (w/RW)  Ambulation  Ambulation?: Yes  WB Status: FWB  More Ambulation?: No  Ambulation 1  Surface: level tile  Device: Rolling Walker  Other Apparatus: AFO, Left  Assistance: Contact guard assistance  Quality of Gait: slow gait, fwd flexed posture, NBOS, tends to push RW too far fwd  Distance: 150 ft x 2  Comments: VC's to stay up inside the RW for safety    Surface: level tile  Ambulation 1  Surface: level tile  Device: Rolling Walker  Other Apparatus: AFO, Left  Assistance: Contact guard assistance  Quality of Gait: slow gait, fwd flexed posture, NBOS, tends to push RW too far fwd  Distance: 150 ft x 2  Comments: VC's to stay up inside the RW for safety      OT:   Eatin - Patient feeds self  Groomin - Independent with all tasks using assistive device  Bathin - Able to bathe all 10 areas with setup/sup/cues (set up, SBA/sup for standing tasks. )  Dressing-Upper: 5 - Requires setup/supervision/cues and/or requires assist with presthesis/brace only  Dressing-Lower: 5 - Requires setup/supervision/cues and/or staff applies TEDS/prosthesis/brace only (set up, A c TEDs)  Toiletin - Did not occur  Toilet Transfer: 0 - Did not occur  Primary Mode: Shower  Tub Transfer: 0 - Activity does not occur  Shower Transfer: 4 - Minimal contact assistance, pt. expends 75% or more effort (CGA)     Social Interaction: 7 - Patient has appropriate behavior/relations 100% of the time  Problem Solvin - Independent with device (e.g. notes, schedules)  Memory: 5 - Patient requires prompting with stress/unfamiliar situations       Objective:  BP (!) 154/65   Pulse 67   Temp 98.6 °F (37 °C) (Rectal)   Resp 18   Ht 5' 9\" (1.753 m)   Wt 213 lb (96.6 kg)   SpO2 99%   BMI 31.45 kg/m²  I Body mass index is 31.45 kg/m². I   Wt Readings from Last 1 Encounters:   18 213 lb (96.6 kg)      Temp (24hrs), Av.4 °F (36.9 °C), Min:98.2 °F (36.8 °C), Max:98.6 °F (37 °C)      Alert, no distress. Oriented ×3  Good

## 2018-02-14 NOTE — PROGRESS NOTES
5 - Able to bathe all 10 areas with setup/sup/cues (set up, SBA/sup for standing tasks. )  Dressing-Upper: 5 - Requires setup/supervision/cues and/or requires assist with presthesis/brace only  Dressing-Lower: 5 - Requires setup/supervision/cues and/or staff applies TEDS/prosthesis/brace only (set up, A c TEDs)  Toiletin - Did not occur  Toilet Transfer: 0 - Did not occur  Primary Mode: Shower  Tub Transfer: 0 - Activity does not occur  Shower Transfer: 4 - Minimal contact assistance, pt. expends 75% or more effort (CGA)    Social Interaction: 7 - Patient has appropriate behavior/relations 100% of the time  Problem Solvin - Independent with device (e.g. notes, schedules)  Memory: 5 - Patient requires prompting with stress/unfamiliar situations    Assessment  Performance deficits / Impairments: Decreased functional mobility ; Decreased ADL status; Decreased balance;Decreased endurance  Prognosis: Good  Discharge Recommendations: Home with assist PRN  Activity Tolerance: Patient Tolerated treatment well;Patient limited by pain  Safety Devices in place: Yes  Type of devices: Left in chair;Call light within reach     Comments: Part of afternoon session was spent finding pt norm per his request as part of his spirtual healing. Plan  Plan  Times per week: 5-7x/week  Times per day: Twice a day  Current Treatment Recommendations: Functional Mobility Training, Endurance Training, Patient/Caregiver Education & Training, Equipment Evaluation, Education, & procurement, Safety Education & Training, Self-Care / ADL, Home Management Training  Patient Goals   Patient goals : To get stronger and go home  Short term goals  Time Frame for Short term goals: One week,   Short term goal 1: Pt. will demo. Mod.I with bed mobility. Short term goal 2: Pt. will demo. SBA with toilet transfer using RW and toileting tasks with good . Short term goal 3: Pt. will demo. Mod.I with BADL's.   Short term goal 4: Pt. will

## 2018-02-15 PROBLEM — Z79.4 TYPE 2 DIABETES MELLITUS WITHOUT COMPLICATION, WITH LONG-TERM CURRENT USE OF INSULIN (HCC): Status: ACTIVE | Noted: 2018-02-13

## 2018-02-15 LAB
ALBUMIN SERPL-MCNC: 3.6 G/DL (ref 3.5–5.2)
ALBUMIN/GLOBULIN RATIO: ABNORMAL (ref 1–2.5)
ALP BLD-CCNC: 41 U/L (ref 40–129)
ALT SERPL-CCNC: 11 U/L (ref 5–41)
ANION GAP SERPL CALCULATED.3IONS-SCNC: 10 MMOL/L (ref 9–17)
AST SERPL-CCNC: 15 U/L
BILIRUB SERPL-MCNC: 0.21 MG/DL (ref 0.3–1.2)
BUN BLDV-MCNC: 29 MG/DL (ref 8–23)
BUN/CREAT BLD: ABNORMAL (ref 9–20)
CALCIUM SERPL-MCNC: 8.8 MG/DL (ref 8.6–10.4)
CHLORIDE BLD-SCNC: 99 MMOL/L (ref 98–107)
CO2: 33 MMOL/L (ref 20–31)
CREAT SERPL-MCNC: 1.78 MG/DL (ref 0.7–1.2)
GFR AFRICAN AMERICAN: 45 ML/MIN
GFR NON-AFRICAN AMERICAN: 37 ML/MIN
GFR SERPL CREATININE-BSD FRML MDRD: ABNORMAL ML/MIN/{1.73_M2}
GFR SERPL CREATININE-BSD FRML MDRD: ABNORMAL ML/MIN/{1.73_M2}
GLUCOSE BLD-MCNC: 102 MG/DL (ref 75–110)
GLUCOSE BLD-MCNC: 153 MG/DL (ref 70–99)
GLUCOSE BLD-MCNC: 153 MG/DL (ref 75–110)
GLUCOSE BLD-MCNC: 175 MG/DL (ref 75–110)
GLUCOSE BLD-MCNC: 194 MG/DL (ref 75–110)
POTASSIUM SERPL-SCNC: 4.1 MMOL/L (ref 3.7–5.3)
SODIUM BLD-SCNC: 142 MMOL/L (ref 135–144)
TOTAL PROTEIN: 6.5 G/DL (ref 6.4–8.3)

## 2018-02-15 PROCEDURE — 97112 NEUROMUSCULAR REEDUCATION: CPT

## 2018-02-15 PROCEDURE — 6370000000 HC RX 637 (ALT 250 FOR IP): Performed by: PHYSICAL MEDICINE & REHABILITATION

## 2018-02-15 PROCEDURE — 82947 ASSAY GLUCOSE BLOOD QUANT: CPT

## 2018-02-15 PROCEDURE — 6360000002 HC RX W HCPCS: Performed by: PHYSICAL MEDICINE & REHABILITATION

## 2018-02-15 PROCEDURE — 97116 GAIT TRAINING THERAPY: CPT

## 2018-02-15 PROCEDURE — 97110 THERAPEUTIC EXERCISES: CPT

## 2018-02-15 PROCEDURE — 6370000000 HC RX 637 (ALT 250 FOR IP): Performed by: STUDENT IN AN ORGANIZED HEALTH CARE EDUCATION/TRAINING PROGRAM

## 2018-02-15 PROCEDURE — 97530 THERAPEUTIC ACTIVITIES: CPT

## 2018-02-15 PROCEDURE — 1180000000 HC REHAB R&B

## 2018-02-15 PROCEDURE — 99232 SBSQ HOSP IP/OBS MODERATE 35: CPT | Performed by: INTERNAL MEDICINE

## 2018-02-15 PROCEDURE — 36415 COLL VENOUS BLD VENIPUNCTURE: CPT

## 2018-02-15 PROCEDURE — 99232 SBSQ HOSP IP/OBS MODERATE 35: CPT | Performed by: PHYSICAL MEDICINE & REHABILITATION

## 2018-02-15 PROCEDURE — 80053 COMPREHEN METABOLIC PANEL: CPT

## 2018-02-15 PROCEDURE — 97535 SELF CARE MNGMENT TRAINING: CPT

## 2018-02-15 RX ORDER — POLYETHYLENE GLYCOL 3350 17 G/17G
17 POWDER, FOR SOLUTION ORAL ONCE
Status: COMPLETED | OUTPATIENT
Start: 2018-02-15 | End: 2018-02-15

## 2018-02-15 RX ORDER — FAMOTIDINE 20 MG/1
20 TABLET, FILM COATED ORAL DAILY
Status: DISCONTINUED | OUTPATIENT
Start: 2018-02-15 | End: 2018-02-21 | Stop reason: HOSPADM

## 2018-02-15 RX ADMIN — FEBUXOSTAT 40 MG: 40 TABLET ORAL at 08:08

## 2018-02-15 RX ADMIN — HYDRALAZINE HYDROCHLORIDE 100 MG: 50 TABLET, FILM COATED ORAL at 15:31

## 2018-02-15 RX ADMIN — OXYCODONE HYDROCHLORIDE AND ACETAMINOPHEN 1000 MG: 500 TABLET ORAL at 08:07

## 2018-02-15 RX ADMIN — POLYETHYLENE GLYCOL 3350 17 G: 17 POWDER, FOR SOLUTION ORAL at 23:58

## 2018-02-15 RX ADMIN — HYDRALAZINE HYDROCHLORIDE 100 MG: 50 TABLET, FILM COATED ORAL at 06:01

## 2018-02-15 RX ADMIN — ASPIRIN 81 MG 81 MG: 81 TABLET ORAL at 08:06

## 2018-02-15 RX ADMIN — SENNOSIDES 17.2 MG: 8.6 TABLET, FILM COATED ORAL at 19:14

## 2018-02-15 RX ADMIN — HEPARIN SODIUM 5000 UNITS: 5000 INJECTION, SOLUTION INTRAVENOUS; SUBCUTANEOUS at 23:58

## 2018-02-15 RX ADMIN — FAMOTIDINE 20 MG: 20 TABLET, FILM COATED ORAL at 08:10

## 2018-02-15 RX ADMIN — LISINOPRIL 20 MG: 20 TABLET ORAL at 19:19

## 2018-02-15 RX ADMIN — CARVEDILOL 25 MG: 25 TABLET, FILM COATED ORAL at 08:06

## 2018-02-15 RX ADMIN — HEPARIN SODIUM 5000 UNITS: 5000 INJECTION, SOLUTION INTRAVENOUS; SUBCUTANEOUS at 08:12

## 2018-02-15 RX ADMIN — FUROSEMIDE 40 MG: 40 TABLET ORAL at 08:10

## 2018-02-15 RX ADMIN — CLONIDINE HYDROCHLORIDE 0.1 MG: 0.1 TABLET ORAL at 08:07

## 2018-02-15 RX ADMIN — DOCUSATE SODIUM 100 MG: 100 CAPSULE, LIQUID FILLED ORAL at 19:14

## 2018-02-15 RX ADMIN — MULTIPLE VITAMINS W/ MINERALS TAB 1 TABLET: TAB at 08:06

## 2018-02-15 RX ADMIN — LISINOPRIL 20 MG: 20 TABLET ORAL at 08:06

## 2018-02-15 RX ADMIN — OXYCODONE HYDROCHLORIDE AND ACETAMINOPHEN 1 TABLET: 5; 325 TABLET ORAL at 18:31

## 2018-02-15 RX ADMIN — ATORVASTATIN CALCIUM 40 MG: 40 TABLET, FILM COATED ORAL at 19:14

## 2018-02-15 RX ADMIN — CLONIDINE HYDROCHLORIDE 0.1 MG: 0.1 TABLET ORAL at 15:31

## 2018-02-15 RX ADMIN — DOCUSATE SODIUM 100 MG: 100 CAPSULE, LIQUID FILLED ORAL at 08:06

## 2018-02-15 RX ADMIN — CLONIDINE HYDROCHLORIDE 0.1 MG: 0.1 TABLET ORAL at 19:20

## 2018-02-15 RX ADMIN — LINAGLIPTIN 5 MG: 5 TABLET, FILM COATED ORAL at 08:08

## 2018-02-15 RX ADMIN — CARVEDILOL 25 MG: 25 TABLET, FILM COATED ORAL at 15:31

## 2018-02-15 RX ADMIN — OXYCODONE HYDROCHLORIDE AND ACETAMINOPHEN 1 TABLET: 5; 325 TABLET ORAL at 12:07

## 2018-02-15 RX ADMIN — OXYCODONE HYDROCHLORIDE AND ACETAMINOPHEN 1 TABLET: 5; 325 TABLET ORAL at 06:01

## 2018-02-15 ASSESSMENT — PAIN SCALES - GENERAL
PAINLEVEL_OUTOF10: 6
PAINLEVEL_OUTOF10: 4
PAINLEVEL_OUTOF10: 0
PAINLEVEL_OUTOF10: 2
PAINLEVEL_OUTOF10: 4
PAINLEVEL_OUTOF10: 6
PAINLEVEL_OUTOF10: 0

## 2018-02-15 NOTE — PROGRESS NOTES
safety        Stairs/Curb  Stairs?: Yes  Stairs  # Steps : 5  Stairs Height: 4\" (Nhung Sainz also)  Rails: Bilateral  Device: No Device  Assistance: Contact guard assistance  Comment: step to pattern on steps                                                    FIMS:      Transfers  Bed, Chair, Wheel Chair: 4 - Requires steadying assistance only <25% assist  and/or requires assist with one leg only   Locomotion  Primary Mode: Walk  Distance Walked: 200 ft  Walk: 4 - Contact Guard/Minimal Assistance Requires up to Contact Guard or Minimal Assistance to walk/operate wheelchair at least 150 feet  Stairs: 2- Maximal Assistance Performs 25-49% of the effort to go up and down 4 to 6 stairs and requires the assistance of one person only       BALANCE Posture: Fair (fwd flexed posture)  Sitting - Static: Good  Sitting - Dynamic: Good  Standing - Static: Fair;+  Standing - Dynamic: Fair  Comments: standing balance with rw for support, narrow HARLEY limiting standing balance    EXERCISES    Other exercises?: Yes  Other exercises 1: Seated bilateral LE x 20 reps 2.5 lbs  Other exercises 2: Red Tband x 20 reps  Other exercises 3: Supine bilateral LE x 20 reps 2.5 lbs  Other exercises 4: Briding x 15 reps  Other exercises 5: Nustep x 15 min L4  Other exercises 6: Standing bilaeral LE x 15 reps  Other exercises 7: UBE standing 10 min f/b           Activity Tolerance: Patient Tolerated treatment well  PT Equipment Recommendations  Equipment Needed: No  Other: TBD       Patient Education  New Education Provided: Posture with ambulation and proper use of RW  Learner:patient  Method: demonstration and explanation       Outcome: demonstrated understanding and needs reinforcement     Current Treatment Recommendations: Strengthening, Transfer Training, Endurance Training, Neuromuscular Re-education, Balance Training, Gait Training, Stair training, Functional Mobility Training    Conditions Requiring Skilled Therapeutic Intervention  Body

## 2018-02-15 NOTE — PLAN OF CARE
Problem: Risk for Impaired Skin Integrity  Goal: Tissue integrity - skin and mucous membranes  Structural intactness and normal physiological function of skin and  mucous membranes. Outcome: Met This Shift  Skin assessment completed this shift. Nutrition and Hydration status assessed with adequate intake. Matheus Score as charted. Bilateral heels remain elevated on pillows throughout the shift. Patient able to reposition self for comfort and to prevent breakdown. Patient verbalizes understanding of pressure ulcer prevention measures. Skin integrity maintained. No new skin breakdown noted. Skin to high risk pressure areas including coccyx and heels are clear.     Lizzie / Incontinence care provided as needed throughout the shift. Aloe Vesta Moisture Barrier ointment applied to buttocks as a preventative measure.                    Problem: Falls - Risk of  Goal: Absence of falls  Outcome: Met This Shift  No falls or injuries sustained at this time. No attempts to get out of bed without nursing assistance. Call light within reach and pt. uses appropriately for assistance. Siderails up x 2. Nonskid footwear remains on. Bed in low and locked position. Hourly nursing rounds made. Pt. Alert and oriented, aware of limitations, and exhibits good safety judgement. Pt. uses assistive devices appropriately. Pt. understands individual fall risk factors.     Pt. reminded to use call light with each nurse/patient interaction.     Pt. room located close to nurse's station.      Bed alarm remains engaged throughout the shift as a precaution.      Problem: Pain:  Goal: Pain level will decrease  Pain level will decrease   Outcome: Ongoing  Pain assessment and reassessment completed so far this shift. Pt. able to rest after the use of pain medication. Patient medicated with 1 percocet Q6hrs for c/o pain in anterior neck from surgery.              Pt. Repositions per self for comfort. Nonverbal cues indicate pain relief.  Pt.

## 2018-02-15 NOTE — CONSULTS
[facility]                                                     Consult Note                                                                   Date:   2/15/2018  Patient name:  Leonor Cantu  Date of admission:  2/11/2018  7:15 PM  MRN:   [id number]  YOB: 1940     Chief Complaint:      Patient seen at the request of Levi Dumont MD   For medical management of               MEDICAL COMORBIDITY [address] [place] [address] . Reason for Consult:  Medication management dm and htn        HISTORY OF PRESENT ILLNESS:   The patient is a 69 y.o.  male . Today ;    S/p cervical spine anterior decompression          . Admitting history ; The patient is a 69 y.o. male who presents with Cervical myelopathy ([address] [G95.9]  Cervical myelopathy ([address] [G95.9]   Principal Problem:    Cervical myelopathy ([address]  Active Problems:    H/O excision of lamina of cervical vertebra for decompression of spinal cord    Paraparesis (HCC)    Type 2 diabetes mellitus without complication ([address]    Essential hypertension  Resolved Problems:    * No resolved hospital problems. *         PAST MEDICAL HISTORY    has a past medical history of Cerebral artery occlusion with cerebral infarction ([address] Chronic kidney disease; Diabetes mellitus ([address] Hyperlipidemia; Hypertension; and Movement disorder. ALLERGIES   Allopurinol     REVIEW OF SYSTEMS     Review of Systems   Neurological: Positive for sensory change and focal weakness. Positive findings ----  All other negative . PHYSICAL EXAM     /63   Pulse 63   Temp 98.7 °F (37.1 °C) (Oral)   Resp 14   Ht 5' 9\" (1.753 m)   Wt 213 lb (96.6 kg)   SpO2 96%   BMI 31.45 kg/m²   Body mass index is 31.45 kg/m².     Physical Exam   Constitutional: He glucose in 15 minutes. If blood glucose is less than 70 mg/dL, repeat treatment and recheck blood glucose in 15 minutes x2. Notify provider. If no intravenous access, administer glucagon 1 mg. After administration, attempt intravenous access and start D5W at 100 mL/hr. Repeat blood glucose in 15 minutes x2 and notify provider. Standing Status:   Standing     Number of Occurrences:   61173    Vital signs per unit routine     Standing Status:   Standing     Number of Occurrences:   1    Tobacco cessation education     Standing Status:   Standing     Number of Occurrences:   1    Notify physician     Notify physician for pulse less than 50 or greater than 120, respiratory rate less than 12 or greater than 25, oral temperature greater than 101.3 F (38.5 C) , urinary output less than 120 mL in four hours, systolic BP less than 90 or greater than 442, diastolic BP less than 50 or greater than 100. Standing Status:   Standing     Number of Occurrences:   1    Up with assistance     Standing Status:   Standing     Number of Occurrences:   85633    Weigh patient     Standing Status:   Standing     Number of Occurrences:   1    Bladder training: If unable to void after 4 hours, scan bladder     If scan reveals amount greater than 300 mL perform intermittent catheterization. Continue to perform bladder scan every 4-6 hours and perform intermittent catheterization for any volume greater than 300 mL. Continue process of bladder scanning and intermittent catheterizations until patient able to void and 3 consecutive PVR less than 100 mL.      Standing Status:   Standing     Number of Occurrences:   1    Place casimiro hose - bilateral     Knee high     Standing Status:   Standing     Number of Occurrences:   1     Order Specific Question:   Laterality     Answer:   Bilateral     Order Specific Question:   Length     Answer:   Knee High    Remove and replace CASIMIRO hose daily     At bedtime     Standing Status:   Standing  Initiate Oxygen Therapy Protocol     - If patient has any of the following conditions, initiate oxygen therapy: SpO2 less than 92%, Cyanosis, Chest Pain, Dyspnea, Home oxygen, or Altered level of consciousness    - If oxygen therapy initiated, enter the RT51 Nasal cannula oxygen order using Per Protocol order mode using the defaulted order parameters and titrate as specified in that order    - If oxygen therapy initiated, notify provider         Standing Status:   Standing     Number of Occurrences:   8    Speech language pathology evaluation     Standing Status:   Standing     Number of Occurrences:   1    POCT Glucose     Repeat blood glucose 15 minutes following intervention. If blood glucose is less than 70 mg/dL, repeat treatment and recheck blood glucose in 15 minutes x2. If blood glucose remains less than 70 mg/dL, call ordering provider for further instruction. If patient experienced a hypoglycemic event in last 24 hours, obtain blood glucose at 0200.      Standing Status:   Standing     Number of Occurrences:   39682    POCT Glucose     Standing Status:   Standing     Number of Occurrences:   21    POC Glucose Fingerstick     Standing Status:   Standing     Number of Occurrences:   1    POC Glucose Fingerstick     Standing Status:   Standing     Number of Occurrences:   1    POC Glucose Fingerstick     Standing Status:   Standing     Number of Occurrences:   1    POC Glucose Fingerstick     Standing Status:   Standing     Number of Occurrences:   1    POC Glucose Fingerstick     Standing Status:   Standing     Number of Occurrences:   1    POC Glucose Fingerstick     Standing Status:   Standing     Number of Occurrences:   1    POC Glucose Fingerstick     Standing Status:   Standing     Number of Occurrences:   1    POC Glucose Fingerstick     Standing Status:   Standing     Number of Occurrences:   1    POC Glucose Fingerstick     Standing Status:   Standing     Number of Occurrences: 1    POC Glucose Fingerstick     Standing Status:   Standing     Number of Occurrences:   1    POC Glucose Fingerstick     Standing Status:   Standing     Number of Occurrences:   1    POC Glucose Fingerstick     Standing Status:   Standing     Number of Occurrences:   1    POC Glucose Fingerstick     Standing Status:   Standing     Number of Occurrences:   1    POC Glucose Fingerstick     Standing Status:   Standing     Number of Occurrences:   1    PATIENT STATUS (DIRECT) Inpatient     Standing Status:   Standing     Number of Occurrences:   1     Order Specific Question:   Patient Class     Answer:   Inpatient [101]     Order Specific Question:   REQUIRED: Diagnosis     Answer:   Cervical myelopathy [facility] [[id number]]     Order Specific Question:   Estimated Length of Stay     Answer:   Estimated stay of more than 2 midnights     Order Specific Question:   Future Attending Provider     Answer:   Yehuda Canseco [[id number]]    Fall precautions     Standing Status:   Standing     Number of Occurrences:   1             Thanks for consulting us . Will monitor vitals and clinical course , and  Optimize therapy  as needed . MD MORALES Abdalla[address], [address] [place] [address].    Phone [phone]   Fax: [phone]  Answering Service: [phone]

## 2018-02-15 NOTE — PROGRESS NOTES
precaution)  Shoulder Extension: x  Horizontal ABduction: x  Horizontal ADduction: x  Elbow Flexion: x  Elbow Extension: x  Grasp/Release: 2nd spring hand gripper; B hand 1-2 min for increased B hand strength/endurance for functional tasks. OT FIM:   Eatin - Patient feeds self  Groomin - Requires setup/cues to do all tasks (set up sinkside. )  Bathin - Did not occur  Dressing-Upper: 0 - Did not occur  Dressing-Lower: 5 - Requires setup/supervision/cues and/or staff applies TEDS/prosthesis/brace only (A c TEDs)  Toiletin - Did not occur  Toilet Transfer: 0 - Did not occur  Primary Mode: Shower  Tub Transfer: 0 - Activity does not occur  Shower Transfer: 0 - Activity does not occur    Social Interaction: 7 - Patient has appropriate behavior/relations 100% of the time  Problem Solvin - Independent with device (e.g. notes, schedules)  Memory: 6 - Patient requires device to recall (e.g. memory book)    Assessment  Performance deficits / Impairments: Decreased functional mobility ; Decreased ADL status; Decreased balance;Decreased endurance  Prognosis: Good  Discharge Recommendations: Home with assist PRN  Activity Tolerance: Patient Tolerated treatment well  Safety Devices in place: Yes  Type of devices: Left in chair;Call light within reach        Plan  Plan  Times per week: 5-7x/week  Times per day: Twice a day  Current Treatment Recommendations: Functional Mobility Training, Endurance Training, Patient/Caregiver Education & Training, Equipment Evaluation, Education, & procurement, Safety Education & Training, Self-Care / ADL, Home Management Training  Patient Goals   Patient goals : To get stronger and go home  Short term goals  Time Frame for Short term goals: One week,   Short term goal 1: Pt. will demo. Mod.I with bed mobility. Short term goal 2: Pt. will demo. SBA with toilet transfer using RW and toileting tasks with good . Short term goal 3: Pt. will demo.  Mod.I with

## 2018-02-15 NOTE — PROGRESS NOTES
Nutrition Assessment    Type and Reason for Visit: Reassess    Nutrition Recommendations: Continue current diet (offer ground meats prn), continue Ensure High Protein bid. Malnutrition Assessment:  · Malnutrition Status: At risk for malnutrition  · Context: Acute illness or injury  · Findings of the 6 clinical characteristics of malnutrition (Minimum of 2 out of 6 clinical characteristics is required to make the diagnosis of moderate or severe Protein Calorie Malnutrition based on AND/ASPEN Guidelines):  1. Energy Intake-Less than or equal to 75%, greater than 7 days      Nutrition Diagnosis:   · Problem: Inadequate oral intake  · Etiology: related to  (Surgery, decreased appetite)     Signs and symptoms:  as evidenced by Diet history of poor intake    Nutrition Assessment:  · Subjective Assessment: Pt states his PO intake is fair. States he sometimes eats all of the foods provided, but has difficulty swallowing so he sometimes does not eat everything. States that his muscles used for swallowing are sore and it hurts to swallow. States he may request ground meats at times. Usual body wt is 214 lb; states he may have lost a little bit since his surgery. Pt trid the Ensure High Protein while this writer in the room; states he will try to drink them to improve his overall intake.    · Nutrition-Focused Physical Findings: Constipation, loss of appetite  · Wound Type: Surgical Wound  · Current Nutrition Therapies:  · Oral Diet Orders: Carb Control 4 Carbs/Meal   · Oral Diet intake: 26-50%  · Anthropometric Measures:  · Ht: 5' 9\" (175.3 cm)   · Current Body Wt: 212 lb 15.4 oz (96.6 kg)  · Admission Body Wt: 212 lb 15.4 oz (96.6 kg)  · Usual Body Wt: 214 lb (97.1 kg)  · Ideal Body Wt: 160 lb (72.6 kg), % Ideal Body 133%  · BMI Classification: BMI 30.0 - 34.9 Obese Class I  · Comparative Standards (Estimated Nutrition Needs):  · Estimated Daily Total Kcal: 2000  · Estimated Daily Protein (g): 101-116 (may vary with

## 2018-02-15 NOTE — PROGRESS NOTES
clean  Lungs clear. Heart regular. Abdomen non-distended, non-tender. + for bowel sounds   No calf tenderness or edema. LE weakness and paresthesia more on the L. 4/5. Improving. , Use of AFO left lower extremity      Medications   Scheduled Meds:   famotidine  20 mg Oral Daily    heparin (porcine)  5,000 Units Subcutaneous BID    insulin lispro  0-6 Units Subcutaneous TID     insulin lispro  0-3 Units Subcutaneous Nightly    docusate sodium  100 mg Oral BID    senna  2 tablet Oral Nightly    linagliptin  5 mg Oral Daily    febuxostat  40 mg Oral Daily    hydrALAZINE  100 mg Oral 3 times per day    therapeutic multivitamin-minerals  1 tablet Oral Daily    atorvastatin  40 mg Oral Nightly    cloNIDine  0.1 mg Oral TID    carvedilol  25 mg Oral BID WC    lisinopril  20 mg Oral BID    aspirin  81 mg Oral Daily    vitamin C  1,000 mg Oral Daily    furosemide  40 mg Oral Daily    [START ON 2/16/2018] cloNIDine  1 patch Transdermal Weekly     Continuous Infusions:   dextrose       PRN Meds:.bisacodyl, glucose, dextrose, glucagon (rDNA), dextrose, oxyCODONE-acetaminophen, acetaminophen, magnesium hydroxide     Diagnostics:     CBC:   Recent Labs      02/14/18   0745   WBC  6.2   RBC  3.25*   HGB  10.1*   HCT  31.0*   MCV  95.6   RDW  13.8   PLT  204     BMP:   Recent Labs      02/14/18   0745  02/15/18   0701   NA  140  142   K  4.0  4.1   CL  99  99   CO2  31  33*   BUN  29*  29*   CREATININE  1.77*  1.78*     BNP: No results for input(s): BNP in the last 72 hours. PT/INR: No results for input(s): PROTIME, INR in the last 72 hours. APTT: No results for input(s): APTT in the last 72 hours. CARDIAC ENZYMES: No results for input(s): CKMB, CKMBINDEX, TROPONINT in the last 72 hours.     Invalid input(s): CKTOTAL;3  FASTING LIPID PANEL:  Lab Results   Component Value Date    CHOL 101 02/14/2018    HDL 28 (L) 02/14/2018    TRIG 152 (H) 02/14/2018     LIVER PROFILE:   Recent Labs      02/14/18   0745 and plan for surgery end of February at [facility] per records  10. DVT prophylaxis heparin,  continue EPC cuff/teds      Deion Shane MD     Patient seen and examined with resident, note reviewed and adjusted. Agree with above.       Tejinder Burnett M.D.

## 2018-02-16 LAB
ALBUMIN SERPL-MCNC: 3.5 G/DL (ref 3.5–5.2)
ALBUMIN/GLOBULIN RATIO: ABNORMAL (ref 1–2.5)
ALP BLD-CCNC: 41 U/L (ref 40–129)
ALT SERPL-CCNC: 12 U/L (ref 5–41)
ANION GAP SERPL CALCULATED.3IONS-SCNC: 8 MMOL/L (ref 9–17)
AST SERPL-CCNC: 15 U/L
BILIRUB SERPL-MCNC: 0.27 MG/DL (ref 0.3–1.2)
BUN BLDV-MCNC: 31 MG/DL (ref 8–23)
BUN/CREAT BLD: ABNORMAL (ref 9–20)
CALCIUM SERPL-MCNC: 8.7 MG/DL (ref 8.6–10.4)
CHLORIDE BLD-SCNC: 101 MMOL/L (ref 98–107)
CO2: 33 MMOL/L (ref 20–31)
CREAT SERPL-MCNC: 1.84 MG/DL (ref 0.7–1.2)
GFR AFRICAN AMERICAN: 44 ML/MIN
GFR NON-AFRICAN AMERICAN: 36 ML/MIN
GFR SERPL CREATININE-BSD FRML MDRD: ABNORMAL ML/MIN/{1.73_M2}
GFR SERPL CREATININE-BSD FRML MDRD: ABNORMAL ML/MIN/{1.73_M2}
GLUCOSE BLD-MCNC: 141 MG/DL (ref 75–110)
GLUCOSE BLD-MCNC: 157 MG/DL (ref 75–110)
GLUCOSE BLD-MCNC: 162 MG/DL (ref 75–110)
GLUCOSE BLD-MCNC: 164 MG/DL (ref 70–99)
GLUCOSE BLD-MCNC: 222 MG/DL (ref 75–110)
POTASSIUM SERPL-SCNC: 4.5 MMOL/L (ref 3.7–5.3)
SODIUM BLD-SCNC: 142 MMOL/L (ref 135–144)
TOTAL PROTEIN: 6.5 G/DL (ref 6.4–8.3)

## 2018-02-16 PROCEDURE — 36415 COLL VENOUS BLD VENIPUNCTURE: CPT

## 2018-02-16 PROCEDURE — 99232 SBSQ HOSP IP/OBS MODERATE 35: CPT | Performed by: PHYSICAL MEDICINE & REHABILITATION

## 2018-02-16 PROCEDURE — 97110 THERAPEUTIC EXERCISES: CPT

## 2018-02-16 PROCEDURE — 97535 SELF CARE MNGMENT TRAINING: CPT

## 2018-02-16 PROCEDURE — 6360000002 HC RX W HCPCS: Performed by: PHYSICAL MEDICINE & REHABILITATION

## 2018-02-16 PROCEDURE — 82947 ASSAY GLUCOSE BLOOD QUANT: CPT

## 2018-02-16 PROCEDURE — 6370000000 HC RX 637 (ALT 250 FOR IP): Performed by: PHYSICAL MEDICINE & REHABILITATION

## 2018-02-16 PROCEDURE — 97530 THERAPEUTIC ACTIVITIES: CPT

## 2018-02-16 PROCEDURE — 80053 COMPREHEN METABOLIC PANEL: CPT

## 2018-02-16 PROCEDURE — 97116 GAIT TRAINING THERAPY: CPT

## 2018-02-16 PROCEDURE — 99231 SBSQ HOSP IP/OBS SF/LOW 25: CPT | Performed by: INTERNAL MEDICINE

## 2018-02-16 PROCEDURE — 1180000000 HC REHAB R&B

## 2018-02-16 RX ADMIN — ATORVASTATIN CALCIUM 40 MG: 40 TABLET, FILM COATED ORAL at 20:25

## 2018-02-16 RX ADMIN — ASPIRIN 81 MG 81 MG: 81 TABLET ORAL at 07:41

## 2018-02-16 RX ADMIN — LINAGLIPTIN 5 MG: 5 TABLET, FILM COATED ORAL at 07:43

## 2018-02-16 RX ADMIN — OXYCODONE HYDROCHLORIDE AND ACETAMINOPHEN 1 TABLET: 5; 325 TABLET ORAL at 11:39

## 2018-02-16 RX ADMIN — LISINOPRIL 20 MG: 20 TABLET ORAL at 20:34

## 2018-02-16 RX ADMIN — CARVEDILOL 25 MG: 25 TABLET, FILM COATED ORAL at 07:41

## 2018-02-16 RX ADMIN — CLONIDINE HYDROCHLORIDE 0.1 MG: 0.1 TABLET ORAL at 20:32

## 2018-02-16 RX ADMIN — OXYCODONE HYDROCHLORIDE AND ACETAMINOPHEN 1 TABLET: 5; 325 TABLET ORAL at 00:16

## 2018-02-16 RX ADMIN — SENNOSIDES 17.2 MG: 8.6 TABLET, FILM COATED ORAL at 20:25

## 2018-02-16 RX ADMIN — CARVEDILOL 25 MG: 25 TABLET, FILM COATED ORAL at 17:26

## 2018-02-16 RX ADMIN — INSULIN LISPRO 2 UNITS: 100 INJECTION, SOLUTION INTRAVENOUS; SUBCUTANEOUS at 11:36

## 2018-02-16 RX ADMIN — CLONIDINE HYDROCHLORIDE 0.1 MG: 0.1 TABLET ORAL at 07:44

## 2018-02-16 RX ADMIN — OXYCODONE HYDROCHLORIDE AND ACETAMINOPHEN 1 TABLET: 5; 325 TABLET ORAL at 17:22

## 2018-02-16 RX ADMIN — HEPARIN SODIUM 5000 UNITS: 5000 INJECTION, SOLUTION INTRAVENOUS; SUBCUTANEOUS at 10:05

## 2018-02-16 RX ADMIN — HYDRALAZINE HYDROCHLORIDE 100 MG: 50 TABLET, FILM COATED ORAL at 17:28

## 2018-02-16 RX ADMIN — FEBUXOSTAT 40 MG: 40 TABLET ORAL at 07:44

## 2018-02-16 RX ADMIN — MAGNESIUM HYDROXIDE 30 ML: 400 SUSPENSION ORAL at 14:46

## 2018-02-16 RX ADMIN — FUROSEMIDE 40 MG: 40 TABLET ORAL at 07:41

## 2018-02-16 RX ADMIN — FAMOTIDINE 20 MG: 20 TABLET, FILM COATED ORAL at 07:41

## 2018-02-16 RX ADMIN — OXYCODONE HYDROCHLORIDE AND ACETAMINOPHEN 1 TABLET: 5; 325 TABLET ORAL at 06:31

## 2018-02-16 RX ADMIN — LISINOPRIL 20 MG: 20 TABLET ORAL at 07:41

## 2018-02-16 RX ADMIN — HYDRALAZINE HYDROCHLORIDE 100 MG: 50 TABLET, FILM COATED ORAL at 20:32

## 2018-02-16 RX ADMIN — DOCUSATE SODIUM 100 MG: 100 CAPSULE, LIQUID FILLED ORAL at 07:41

## 2018-02-16 RX ADMIN — OXYCODONE HYDROCHLORIDE AND ACETAMINOPHEN 1000 MG: 500 TABLET ORAL at 07:44

## 2018-02-16 RX ADMIN — DOCUSATE SODIUM 100 MG: 100 CAPSULE, LIQUID FILLED ORAL at 20:25

## 2018-02-16 RX ADMIN — HEPARIN SODIUM 5000 UNITS: 5000 INJECTION, SOLUTION INTRAVENOUS; SUBCUTANEOUS at 20:35

## 2018-02-16 RX ADMIN — MULTIPLE VITAMINS W/ MINERALS TAB 1 TABLET: TAB at 11:40

## 2018-02-16 ASSESSMENT — PAIN SCALES - GENERAL
PAINLEVEL_OUTOF10: 0
PAINLEVEL_OUTOF10: 5
PAINLEVEL_OUTOF10: 2
PAINLEVEL_OUTOF10: 4
PAINLEVEL_OUTOF10: 0
PAINLEVEL_OUTOF10: 2
PAINLEVEL_OUTOF10: 0
PAINLEVEL_OUTOF10: 5

## 2018-02-16 ASSESSMENT — PAIN DESCRIPTION - PROGRESSION

## 2018-02-16 ASSESSMENT — PAIN DESCRIPTION - ORIENTATION: ORIENTATION: MID

## 2018-02-16 ASSESSMENT — PAIN DESCRIPTION - DESCRIPTORS: DESCRIPTORS: ACHING

## 2018-02-16 ASSESSMENT — PAIN DESCRIPTION - LOCATION: LOCATION: NECK

## 2018-02-16 ASSESSMENT — PAIN DESCRIPTION - FREQUENCY: FREQUENCY: CONTINUOUS

## 2018-02-16 ASSESSMENT — PAIN DESCRIPTION - ONSET: ONSET: ON-GOING

## 2018-02-16 ASSESSMENT — PAIN DESCRIPTION - PAIN TYPE: TYPE: SURGICAL PAIN

## 2018-02-16 NOTE — PROGRESS NOTES
Occupational [address] [facility] REHABILITATION OCCUPATIONAL THERAPY  DAILY NOTE    Date: 18  Patient Name: Dylon Brothers      Room: 5438/0020-50    MRN: [id number]   : 1940  (69 y.o.)  Gender: male      Diagnosis: Cervical Myelopathy s/pC4-C5, C5-6 ACDF on 18  Additional Pertinent Hx: h/o CVA    Restrictions  Restrictions/Precautions: General Precautions  Other position/activity restrictions: 18 underwent C4-5, C5-6 ACDF for cervical myelopathy  Required Braces or Orthoses  Left Lower Extremity Brace: Wilma Foot Orthotics  Position Activity Restriction  Other position/activity restrictions: 18 underwent C4-5, C5-6 ACDF for cervical myelopathy     Subjective  Subjective: \" I am suppose to have back surgery end of this month but I am not ready for it. I need more time to recover from my neck surgery. \"  Comments: Pt. cooperative and pleasant. Patient Currently in Pain: Denies  Pain Level: 0  Restrictions/Precautions: General Precautions          Objective     Balance  Sitting Balance: Modified independent  (static: indep., dynamic: Mod.I)  Standing Balance: Stand by assistance (SBA-close supervision)  Transfers  Stand Step Transfers: Stand by assistance (RW)  Sit to stand: Stand by assistance  Stand to sit: Stand by assistance  Standing Balance  Time: Am : 2-3 min. X 3 PM : 8 min.   Activity: AM : functional mobility, brushing teeth , radhames-care, clothing mgmt PM : folding his laundry  Sit to stand: Stand by assistance  Stand to sit: Stand by assistance  Functional Mobility  Functional - Mobility Device: Rolling Walker  Activity: To/from bathroom  Assist Level: Stand by assistance  Functional Mobility Comments: Am and PM  Toilet Transfers  Toilet - Technique: Ambulating  Equipment Used: Grab bars  Toilet Transfer: Stand by assistance  Toilet Transfers Comments: Pt. urinated in standing in AM and PM.  Shower Transfers  Shower - Transfer From: [facility]

## 2018-02-16 NOTE — CONSULTS
[facility]                                                     Consult Note                                                                   Date:   2/16/2018  Patient name:  Harmony Godfrey  Date of admission:  2/11/2018  7:15 PM  MRN:   [id number]  YOB: 1940     Chief Complaint:      Patient seen at the request of Keyana Hill MD   For medical management of               MEDICAL COMORBIDITY [address] [place] [address] . Reason for Consult:  Medication management dm and htn        HISTORY OF PRESENT ILLNESS:   The patient is a 69 y.o.  male . Today ;    S/p cervical spine anterior decompression          . Admitting history ; The patient is a 69 y.o. male who presents with Cervical myelopathy ([address] [G95.9]  Cervical myelopathy ([address] [G95.9]   Principal Problem:    Cervical myelopathy ([address]  Active Problems:    H/O excision of lamina of cervical vertebra for decompression of spinal cord    Paraparesis (HCC)    Type 2 diabetes mellitus without complication, with long-term current use of insulin ([address]    Essential hypertension  Resolved Problems:    * No resolved hospital problems. *         PAST MEDICAL HISTORY    has a past medical history of Cerebral artery occlusion with cerebral infarction ([address] Chronic kidney disease; Diabetes mellitus ([address] Hyperlipidemia; Hypertension; and Movement disorder. ALLERGIES   Allopurinol     REVIEW OF SYSTEMS     Review of Systems   Neurological: Positive for sensory change and focal weakness. Positive findings ----  All other negative .        PHYSICAL EXAM     BP (!) 137/59   Pulse 56   Temp 98.3 °F (36.8 °C) (Oral)   Resp 20   Ht 5' 9\" (1.753 m)   Wt 213 lb (96.6 kg)   SpO2 99%   BMI 31.45 kg/m²   Body mass index is 31.45 kg/m². Physical Exam   Constitutional: He is cooperative. Neck: Carotid bruit is not present. No thyroid mass and no thyromegaly present. Cardiovascular: Normal rate, regular rhythm and normal heart sounds. Exam reveals no S3. No murmur heard. Pulmonary/Chest: Effort normal and breath sounds normal.   Neurological: He is alert. He has normal strength. Skin: Skin is warm and dry. No rash noted. Psychiatric: He has a normal mood and affect. DIAGNOSTICS       URINE ANALYSIS: No results found for: LABURIN     CBC:  Lab Results   Component Value Date    WBC 6.2 02/14/2018    HGB 10.1 02/14/2018     02/14/2018        BMP:    Lab Results   Component Value Date     02/16/2018    K 4.5 02/16/2018     02/16/2018    CO2 33 02/16/2018    BUN 31 02/16/2018    CREATININE 1.84 02/16/2018    GLUCOSE 164 02/16/2018      LIVER PROFILE:  Lab Results   Component Value Date    ALT 12 02/16/2018    AST 15 02/16/2018    PROT 6.5 02/16/2018    BILITOT 0.27 02/16/2018    LABALBU 3.5 02/16/2018               . Radiology ;          vitals / serial labs / CURRENT MEDS    REVIEWED CURRENT VITALS , LABS  AND MEDICATIONS  AS NOTED BELOW :           Vitals:    02/15/18 1915 02/15/18 2358 02/16/18 0623 02/16/18 1439   BP: (!) 134/50 (!) 106/39 (!) 134/55 (!) 137/59   Pulse: 58  59 56   Resp: 16  16 20   Temp: 98.6 °F (37 °C)  98.3 °F (36.8 °C)    TempSrc:   Oral    SpO2:   96% 99%   Weight:       Height:               Scheduled Meds:   famotidine  20 mg Oral Daily    heparin (porcine)  5,000 Units Subcutaneous BID    insulin lispro  0-6 Units Subcutaneous TID WC    docusate sodium  100 mg Oral BID    senna  2 tablet Oral Nightly    linagliptin  5 mg Oral Daily    febuxostat  40 mg Oral Daily    hydrALAZINE  100 mg Oral 3 times per day    therapeutic multivitamin-minerals  1 tablet Oral Daily    atorvastatin  40 mg Oral Nightly    cloNIDine  0.1 mg Oral TID    carvedilol  25 mg Oral BID WC    prophylactic therapy cannot be provided due to patient limitations, serial screening Duplex Doppler Ultrasound studies of the legs may be considered to assess for development of DVT. Standing Status:   Standing     Number of Occurrences:   1    Elevate heels off of bed at all times if patient is not able to move lower extremities     Standing Status:   Standing     Number of Occurrences:   1    Turn or assist with turn every 2 hours if patient is unable to turn self. Remind patient to turn if necessary. Standing Status:   Standing     Number of Occurrences:   1    Inspect skin per unit guidelines     Standing Status:   Standing     Number of Occurrences:   1    Maintain HOB at the lowest elevation consistent with medical plan of care     Standing Status:   Standing     Number of Occurrences:   1    Encourage fluids     Standing Status:   Standing     Number of Occurrences:   1    Full Code     Standing Status:   Standing     Number of Occurrences:   1    Consult to Internal Medicine     Standing Status:   Standing     Number of Occurrences:   1     Order Specific Question:   Reason for Consult? Answer:   med management    Inpatient consult to Social Work     Standing Status:   Standing     Number of Occurrences:   1     Order Specific Question:   Reason for Consult? Answer:   d/c planning    Inpatient consult to Recreation Therapy     Standing Status:   Standing     Number of Occurrences:   1     Order Specific Question:   Reason for Consult?      Answer:   eval/treat    Dietary Nutrition Supplements: Low Calorie High Protein Supplement     Standing Status:   Standing     Number of Occurrences:   1     Order Specific Question:   Select Supplement:     Answer:   Low Calorie High Protein Supplement    OT eval and treat     Standing Status:   Standing     Number of Occurrences:   1    PT evaluation and treat     Standing Status:   Standing     Number of Occurrences:   1    Initiate Oxygen

## 2018-02-16 NOTE — PROGRESS NOTES
Physical Medicine & Rehabilitation  Progress Note    2/16/2018 11:17 AM     CC: Ambulatory and ADL dysfunction due to cervical myelopathy, neurogenic claudication and neuropathy status post ACDF C4 through C6 on Feb 6th at [address]:   No issues overnight   BP is well controlled. He is eating solid food well. No choking. No issues with bladder/urination  Passing gas, no BM overnight for the second day. +BM 2 days ago with suppository  Doing well with PT/OT . BMP showed mild elevation in cr (overall stable)   Pt had discussion with his wife and he prefers to get stronger and continue therapy       ROS:  Denies fevers, chills, sweats. No chest pain, palpitations, lightheadedness. Denies coughing, wheezing or shortness of breath. Denies abdominal pain, nausea, diarrhea or  constipation. No new areas of joint pain. Denies new areas of numbness or weakness. Denies new anxiety or depression issues. No new skin problems. Rehabilitation:       PT:  Restrictions/Precautions: General Precautions  Other position/activity restrictions: 2/6/18 underwent C4-5, C5-6 ACDF for cervical myelopathy  Required Braces or Orthoses  Left Lower Extremity Brace:  Wilma Foot Orthotics   Transfers  Sit to Stand: Contact guard assistance (vc's for hand placement)  Stand to sit: Contact guard assistance (cues for hand placement)  Stand Pivot Transfers: Contact guard assistance (w/RW)  WB Status: FWB  Ambulation 1  Surface: level tile  Device: Rolling Walker  Other Apparatus: AFO, Left  Assistance: Contact guard assistance  Quality of Gait: slow gait, fwd flexed posture, NBOS, tends to push RW too far fwd  Distance: 150 ft x 2  Comments: VC's to stay up inside the RW for safety    Transfers  Sit to Stand: Contact guard assistance (vc's for hand placement)  Stand to sit: Contact guard assistance (cues for hand placement)  Stand Pivot Transfers: Contact guard assistance (w/RW)  Ambulation  Ambulation?: Yes  WB Status:

## 2018-02-17 LAB
ANION GAP SERPL CALCULATED.3IONS-SCNC: 8 MMOL/L (ref 9–17)
BUN BLDV-MCNC: 30 MG/DL (ref 8–23)
BUN/CREAT BLD: ABNORMAL (ref 9–20)
CALCIUM SERPL-MCNC: 8.7 MG/DL (ref 8.6–10.4)
CHLORIDE BLD-SCNC: 103 MMOL/L (ref 98–107)
CO2: 32 MMOL/L (ref 20–31)
CREAT SERPL-MCNC: 1.68 MG/DL (ref 0.7–1.2)
GFR AFRICAN AMERICAN: 48 ML/MIN
GFR NON-AFRICAN AMERICAN: 40 ML/MIN
GFR SERPL CREATININE-BSD FRML MDRD: ABNORMAL ML/MIN/{1.73_M2}
GFR SERPL CREATININE-BSD FRML MDRD: ABNORMAL ML/MIN/{1.73_M2}
GLUCOSE BLD-MCNC: 131 MG/DL (ref 75–110)
GLUCOSE BLD-MCNC: 163 MG/DL (ref 70–99)
GLUCOSE BLD-MCNC: 169 MG/DL (ref 75–110)
GLUCOSE BLD-MCNC: 183 MG/DL (ref 75–110)
HCT VFR BLD CALC: 29.2 % (ref 41–53)
HEMOGLOBIN: 9.7 G/DL (ref 13.5–17.5)
MCH RBC QN AUTO: 31.8 PG (ref 26–34)
MCHC RBC AUTO-ENTMCNC: 33.1 G/DL (ref 31–37)
MCV RBC AUTO: 96.1 FL (ref 80–100)
NRBC AUTOMATED: ABNORMAL PER 100 WBC
PDW BLD-RTO: 13.6 % (ref 11.5–14.9)
PLATELET # BLD: 234 K/UL (ref 150–450)
PMV BLD AUTO: 7.7 FL (ref 6–12)
POTASSIUM SERPL-SCNC: 4.4 MMOL/L (ref 3.7–5.3)
RBC # BLD: 3.04 M/UL (ref 4.5–5.9)
SODIUM BLD-SCNC: 143 MMOL/L (ref 135–144)
WBC # BLD: 5.6 K/UL (ref 3.5–11)

## 2018-02-17 PROCEDURE — 6370000000 HC RX 637 (ALT 250 FOR IP): Performed by: PHYSICAL MEDICINE & REHABILITATION

## 2018-02-17 PROCEDURE — 36415 COLL VENOUS BLD VENIPUNCTURE: CPT

## 2018-02-17 PROCEDURE — 97116 GAIT TRAINING THERAPY: CPT

## 2018-02-17 PROCEDURE — 97110 THERAPEUTIC EXERCISES: CPT

## 2018-02-17 PROCEDURE — 80048 BASIC METABOLIC PNL TOTAL CA: CPT

## 2018-02-17 PROCEDURE — 85027 COMPLETE CBC AUTOMATED: CPT

## 2018-02-17 PROCEDURE — 97535 SELF CARE MNGMENT TRAINING: CPT

## 2018-02-17 PROCEDURE — 99232 SBSQ HOSP IP/OBS MODERATE 35: CPT | Performed by: PHYSICAL MEDICINE & REHABILITATION

## 2018-02-17 PROCEDURE — 82947 ASSAY GLUCOSE BLOOD QUANT: CPT

## 2018-02-17 PROCEDURE — 6360000002 HC RX W HCPCS: Performed by: PHYSICAL MEDICINE & REHABILITATION

## 2018-02-17 PROCEDURE — 99231 SBSQ HOSP IP/OBS SF/LOW 25: CPT | Performed by: INTERNAL MEDICINE

## 2018-02-17 PROCEDURE — 1180000000 HC REHAB R&B

## 2018-02-17 PROCEDURE — 97530 THERAPEUTIC ACTIVITIES: CPT

## 2018-02-17 RX ORDER — POLYETHYLENE GLYCOL 3350 17 G/17G
17 POWDER, FOR SOLUTION ORAL DAILY
Status: DISCONTINUED | OUTPATIENT
Start: 2018-02-17 | End: 2018-02-21 | Stop reason: HOSPADM

## 2018-02-17 RX ADMIN — HYDRALAZINE HYDROCHLORIDE 100 MG: 50 TABLET, FILM COATED ORAL at 22:48

## 2018-02-17 RX ADMIN — POLYETHYLENE GLYCOL 3350 17 G: 17 POWDER, FOR SOLUTION ORAL at 11:47

## 2018-02-17 RX ADMIN — DOCUSATE SODIUM 100 MG: 100 CAPSULE, LIQUID FILLED ORAL at 20:21

## 2018-02-17 RX ADMIN — OXYCODONE HYDROCHLORIDE AND ACETAMINOPHEN 1000 MG: 500 TABLET ORAL at 11:36

## 2018-02-17 RX ADMIN — OXYCODONE HYDROCHLORIDE AND ACETAMINOPHEN 1 TABLET: 5; 325 TABLET ORAL at 12:55

## 2018-02-17 RX ADMIN — BISACODYL 10 MG: 10 SUPPOSITORY RECTAL at 11:35

## 2018-02-17 RX ADMIN — ASPIRIN 81 MG 81 MG: 81 TABLET ORAL at 11:34

## 2018-02-17 RX ADMIN — HEPARIN SODIUM 5000 UNITS: 5000 INJECTION, SOLUTION INTRAVENOUS; SUBCUTANEOUS at 20:18

## 2018-02-17 RX ADMIN — SENNOSIDES 17.2 MG: 8.6 TABLET, FILM COATED ORAL at 20:21

## 2018-02-17 RX ADMIN — LISINOPRIL 20 MG: 20 TABLET ORAL at 11:45

## 2018-02-17 RX ADMIN — FAMOTIDINE 20 MG: 20 TABLET, FILM COATED ORAL at 11:34

## 2018-02-17 RX ADMIN — LINAGLIPTIN 5 MG: 5 TABLET, FILM COATED ORAL at 11:36

## 2018-02-17 RX ADMIN — ATORVASTATIN CALCIUM 40 MG: 40 TABLET, FILM COATED ORAL at 20:21

## 2018-02-17 RX ADMIN — LISINOPRIL 20 MG: 20 TABLET ORAL at 20:21

## 2018-02-17 RX ADMIN — FEBUXOSTAT 40 MG: 40 TABLET ORAL at 11:36

## 2018-02-17 RX ADMIN — FUROSEMIDE 40 MG: 40 TABLET ORAL at 11:45

## 2018-02-17 RX ADMIN — HEPARIN SODIUM 5000 UNITS: 5000 INJECTION, SOLUTION INTRAVENOUS; SUBCUTANEOUS at 11:42

## 2018-02-17 RX ADMIN — CARVEDILOL 25 MG: 25 TABLET, FILM COATED ORAL at 11:34

## 2018-02-17 RX ADMIN — CARVEDILOL 25 MG: 25 TABLET, FILM COATED ORAL at 17:27

## 2018-02-17 RX ADMIN — OXYCODONE HYDROCHLORIDE AND ACETAMINOPHEN 1 TABLET: 5; 325 TABLET ORAL at 20:21

## 2018-02-17 RX ADMIN — CLONIDINE HYDROCHLORIDE 0.1 MG: 0.1 TABLET ORAL at 11:36

## 2018-02-17 RX ADMIN — CLONIDINE HYDROCHLORIDE 0.1 MG: 0.1 TABLET ORAL at 20:21

## 2018-02-17 RX ADMIN — MULTIPLE VITAMINS W/ MINERALS TAB 1 TABLET: TAB at 11:45

## 2018-02-17 RX ADMIN — HYDRALAZINE HYDROCHLORIDE 100 MG: 50 TABLET, FILM COATED ORAL at 06:20

## 2018-02-17 RX ADMIN — OXYCODONE HYDROCHLORIDE AND ACETAMINOPHEN 1 TABLET: 5; 325 TABLET ORAL at 06:20

## 2018-02-17 RX ADMIN — OXYCODONE HYDROCHLORIDE AND ACETAMINOPHEN 1 TABLET: 5; 325 TABLET ORAL at 00:01

## 2018-02-17 RX ADMIN — DOCUSATE SODIUM 100 MG: 100 CAPSULE, LIQUID FILLED ORAL at 11:33

## 2018-02-17 ASSESSMENT — PAIN DESCRIPTION - PROGRESSION

## 2018-02-17 ASSESSMENT — PAIN DESCRIPTION - PAIN TYPE
TYPE: ACUTE PAIN;SURGICAL PAIN
TYPE: ACUTE PAIN

## 2018-02-17 ASSESSMENT — PAIN SCALES - GENERAL
PAINLEVEL_OUTOF10: 5
PAINLEVEL_OUTOF10: 4
PAINLEVEL_OUTOF10: 4
PAINLEVEL_OUTOF10: 0
PAINLEVEL_OUTOF10: 5
PAINLEVEL_OUTOF10: 4
PAINLEVEL_OUTOF10: 0
PAINLEVEL_OUTOF10: 4

## 2018-02-17 ASSESSMENT — PAIN DESCRIPTION - ORIENTATION: ORIENTATION: RIGHT;LEFT

## 2018-02-17 ASSESSMENT — PAIN DESCRIPTION - LOCATION: LOCATION: SHOULDER

## 2018-02-17 NOTE — PROGRESS NOTES
term goal 4: Pt improve standing balance to good with B UE support  Short term goal 5: Pt negotiate 4 stairs with 1 rail and CGA  Long term goals  Time Frame for Long term goals : 10 days  Long term goal 1: Pt amb 300 ft with rw on uneven surfaces modified indep with proper postural awareness  Long term goal 2: Pt negotiate 12 stairs with left rail modified indep for community assess  Patient Goals   Patient goals :  To get stronger    Plan    Plan  Times per week: 1.5 hours/day, 5-7days a week  Times per day: Daily  Current Treatment Recommendations: Strengthening, Transfer Training, Endurance Training, Neuromuscular Re-education, Balance Training, Gait Training, Stair training, Functional Mobility Training  Safety Devices  Type of devices: Gait belt  Restraints  Initially in place: No     Therapy Time   Individual Concurrent Group Co-treatment   Time In 0100  945         Time Out 0120  [address]         Minutes Tamiko, SHANI

## 2018-02-17 NOTE — PLAN OF CARE
Problem: Risk for Impaired Skin Integrity  Goal: Tissue integrity - skin and mucous membranes  Structural intactness and normal physiological function of skin and  mucous membranes. Outcome: Ongoing  Surgical incision healing well, ecchymosis present . Good approximation, no drainage    Problem: Falls - Risk of  Goal: Absence of falls  Outcome: Ongoing  No fall this shift.   Up with walker and 1 assist. Gait steady and erect    Problem: Pain:  Goal: Pain level will decrease  Pain level will decrease   Outcome: Ongoing  Pain persists but is well controlled with PO pain meds

## 2018-02-17 NOTE — PROGRESS NOTES
Occupational [address] [place] [address] REHABILITATION OCCUPATIONAL THERAPY  DAILY NOTE    Date: 18  Patient Name: Dylon Brothers      Room: 6574/5258-30    MRN: [id number]   : 1940  (69 y.o.)  Gender: male      Diagnosis: Cervical Myelopathy s/pC4-C5, C5-6 ACDF on 18  Additional Pertinent Hx: h/o CVA    Restrictions  Restrictions/Precautions: General Precautions, Fall Risk  Other position/activity restrictions: 18 underwent C4-5, C5-6 ACDF for cervical myelopathy  Required Braces or Orthoses  Left Lower Extremity Brace: Wilma Foot Orthotics  Position Activity Restriction  Other position/activity restrictions: 18 underwent C4-5, C5-6 ACDF for cervical myelopathy     Subjective  Subjective: \" I want to go home Monday instead of Wednesday  Comments: Pt. cooperative and pleasant. Patient Currently in Pain: Denies  Pain Level: 0  Restrictions/Precautions: General Precautions; Fall Risk          Objective     Balance  Sitting Balance: Modified independent   Standing Balance: Modified independent   Transfers  Sit to stand: Modified independent  Stand to sit: Modified independent  Standing Balance  Time: Am : 2-3 min. X 3 PM : 8-10 min. Activity: AM : functional mobility, brushing teeth , radhames-care, clothing mgmt PM : folding his laundry, kitchen functional mobility  Sit to stand: Modified independent  Stand to sit: Modified independent  Functional Mobility  Functional - Mobility Device: Rolling Walker  Assist Level: Stand by assistance  Functional Mobility Comments: PM :Pt. removed clothes from washing mashin, placed them in dryer and removed clothes from dryer using reacher with good balance. Pt. completed kitchen functional mobility with SBA and using RW with good . and good balance. OT instructed pt. on home safety and fall prevention. Pt. demo. good understanding.      OT FIM:   Eatin - Patient feeds self  Groomin - Independent with all tasks using assistive transfer using RW and toileting tasks with good . Short term goal 3: Pt. will demo. Mod.I with BADL's. Short term goal 4: Pt. will tolerate 30+ min. functional activities/therapeutic ex. to promote increased indep.with ADL's and mobility. Short term goal 5: Pt. will demo. SBA with light homemgmt tasks. Short term goal 6: Pt. will tolerate 8-10 min. functional standing activities to promote increased indep.with ADL's and mobility. Long term goals  Time Frame for Long term goals : By discharge,   Long term goal 1: Pt. will demo. Mod.I with functional ADL transfers and functional mobility using appropriate AD with good . Long term goal 2: Pt. will demo. Mod.I with light homemgmt tasks.      02/17/18 1514 02/17/18 1515   OT Individual Minutes   Time In 1035 1330   Time Out 1135 1400   Minutes 60 30     Electronically signed by BRIAN Moraes on 2/17/18 at 3:15 PM

## 2018-02-17 NOTE — PROGRESS NOTES
tile  Ambulation 1  Surface: level tile  Device: Rolling Walker  Other Apparatus: AFO, Left  Assistance: Contact guard assistance  Quality of Gait: slow gait, fwd flexed posture, NBOS, tends to push RW too far fwd  Distance: 150 ft x 2  Comments: VC's to stay up inside the RW for safety    OT FIM:   Eatin - Patient feeds self  Groomin - Independent with all tasks using assistive device (brushed teeth standing at the sink, shaved seated )  Bathin - Able to bathe all 10 areas with setup/sup/cues  Dressing-Upper: 5 - Requires setup/supervision/cues and/or requires assist with presthesis/brace only  Dressing-Lower: 5 - Requires setup/supervision/cues and/or staff applies TEDS/prosthesis/brace only (only assist with donning marcin hose)  Toiletin - Requires device (grab bar/walker/etc.)  Toilet Transfer: 5 - Requires setup/supervision/cues (close supervision to urinate in standing, AM and PM)  Primary Mode: Shower  Shower Transfer: 5 - Supervision, set-up, cues (close supervision)     Social Interaction: 7 - Patient has appropriate behavior/relations 100% of the time  Problem Solvin - Independent with device (e.g. notes, schedules)  Memory: 6 - Patient requires device to recall (e.g. memory book       Objective:  BP (!) 154/69   Pulse 57   Temp 98.1 °F (36.7 °C)   Resp 16   Ht 5' 9\" (1.753 m)   Wt 213 lb (96.6 kg)   SpO2 98%   BMI 31.45 kg/m²  I Body mass index is 31.45 kg/m². I   Wt Readings from Last 1 Encounters:   18 213 lb (96.6 kg)      Temp (24hrs), Av.3 °F (36.8 °C), Min:98.1 °F (36.7 °C), Max:98.4 °F (36.9 °C)      Alert, no distress. Oriented ×3  Good speech and language function. Midline incision on the anterior side of the neck, clean  Lungs clear. Heart regular. Abdomen non-distended, non-tender. + for bowel sounds   No calf tenderness or edema. LE weakness and paresthesia more on the L. 4/5.  Improving. , Use of AFO left lower extremity      Medications   Scheduled Meds:   famotidine  20 mg Oral Daily    heparin (porcine)  5,000 Units Subcutaneous BID    insulin lispro  0-6 Units Subcutaneous TID     docusate sodium  100 mg Oral BID    senna  2 tablet Oral Nightly    linagliptin  5 mg Oral Daily    febuxostat  40 mg Oral Daily    hydrALAZINE  100 mg Oral 3 times per day    therapeutic multivitamin-minerals  1 tablet Oral Daily    atorvastatin  40 mg Oral Nightly    cloNIDine  0.1 mg Oral TID    carvedilol  25 mg Oral BID WC    lisinopril  20 mg Oral BID    aspirin  81 mg Oral Daily    vitamin C  1,000 mg Oral Daily    furosemide  40 mg Oral Daily     Continuous Infusions:   dextrose       PRN Meds:.bisacodyl, glucose, dextrose, glucagon (rDNA), dextrose, oxyCODONE-acetaminophen, acetaminophen, magnesium hydroxide     Diagnostics:     CBC:   Recent Labs      02/17/18   0711   WBC  5.6   RBC  3.04*   HGB  9.7*   HCT  29.2*   MCV  96.1   RDW  13.6   PLT  234     BMP:   Recent Labs      02/15/18   0701  02/16/18   0719  02/17/18   0711   NA  142  142  143   K  4.1  4.5  4.4   CL  99  101  103   CO2  33*  33*  32*   BUN  29*  31*  30*   CREATININE  1.78*  1.84*  1.68*     BNP: No results for input(s): BNP in the last 72 hours. PT/INR: No results for input(s): PROTIME, INR in the last 72 hours. APTT: No results for input(s): APTT in the last 72 hours. CARDIAC ENZYMES: No results for input(s): CKMB, CKMBINDEX, TROPONINT in the last 72 hours. Invalid input(s): CKTOTAL;3  FASTING LIPID PANEL:  Lab Results   Component Value Date    CHOL 101 02/14/2018    HDL 28 (L) 02/14/2018    TRIG 152 (H) 02/14/2018     LIVER PROFILE:   Recent Labs      02/15/18   0701  02/16/18   0719   AST  15  15   ALT  11  12   BILITOT  0.21*  0.27*   ALKPHOS  41  41        I/O (24Hr):     Intake/Output Summary (Last 24 hours) at 02/17/18 1046  Last data filed at 02/16/18 1731   Gross per 24 hour   Intake              600 ml   Output                0 ml   Net              600 ml       Glu

## 2018-02-17 NOTE — CONSULTS
[address] [facility]                                                     Consult Note                                                                   Date:   2/17/2018  Patient name:  Katie Mariano  Date of admission:  2/11/2018  7:15 PM  MRN:   [id number]  YOB: 1940     Chief Complaint:      Patient seen at the request of Nigel Andrea MD   For medical management of               MEDICAL COMORBIDITY [address] [place] [address] . Reason for Consult:  Medication management dm and htn        HISTORY OF PRESENT ILLNESS:   The patient is a 69 y.o.  male . Today ;    S/p cervical spine anterior decompression          . Admitting history ; The patient is a 69 y.o. male who presents with Cervical myelopathy ([address] [G95.9]  Cervical myelopathy ([address] [G95.9]   Principal Problem:    Cervical myelopathy ([address]  Active Problems:    H/O excision of lamina of cervical vertebra for decompression of spinal cord    Paraparesis (HCC)    Type 2 diabetes mellitus without complication, with long-term current use of insulin ([address]    Essential hypertension  Resolved Problems:    * No resolved hospital problems. *         PAST MEDICAL HISTORY    has a past medical history of Cerebral artery occlusion with cerebral infarction ([address] Chronic kidney disease; Diabetes mellitus ([address] Hyperlipidemia; Hypertension; and Movement disorder. ALLERGIES   Allopurinol     REVIEW OF SYSTEMS     Review of Systems   Neurological: Positive for sensory change and focal weakness. Positive findings ----  All other negative .        PHYSICAL EXAM     BP (!) 154/69   Pulse 57   Temp 98.1 °F (36.7 °C)   Resp 16   Ht 5' 9\" (1.753 m)   Wt 213 lb (96.6 kg)   SpO2 98%   BMI 31.45 kg/m²   Body mass index is 31.45 kg/m². Physical Exam   Constitutional: He is cooperative. Neck: Carotid bruit is not present. No thyroid mass and no thyromegaly present. Cardiovascular: Normal rate, regular rhythm and normal heart sounds. Exam reveals no S3. No murmur heard. Pulmonary/Chest: Effort normal and breath sounds normal.   Neurological: He is alert. He has normal strength. Skin: Skin is warm and dry. No rash noted. Psychiatric: He has a normal mood and affect. DIAGNOSTICS       URINE ANALYSIS: No results found for: LABURIN     CBC:  Lab Results   Component Value Date    WBC 5.6 02/17/2018    HGB 9.7 02/17/2018     02/17/2018        BMP:    Lab Results   Component Value Date     02/17/2018    K 4.4 02/17/2018     02/17/2018    CO2 32 02/17/2018    BUN 30 02/17/2018    CREATININE 1.68 02/17/2018    GLUCOSE 163 02/17/2018      LIVER PROFILE:  Lab Results   Component Value Date    ALT 12 02/16/2018    AST 15 02/16/2018    PROT 6.5 02/16/2018    BILITOT 0.27 02/16/2018    LABALBU 3.5 02/16/2018               . Radiology ;          vitals / serial labs / CURRENT MEDS    REVIEWED CURRENT VITALS , LABS  AND MEDICATIONS  AS NOTED BELOW :           Vitals:    02/16/18 1725 02/16/18 1729 02/16/18 1828 02/17/18 0617   BP: (!) 180/71 (!) 187/84 (!) 183/67 (!) 154/69   Pulse: 66  69 57   Resp: 20  16 16   Temp: 98.4 °F (36.9 °C)  98.4 °F (36.9 °C) 98.1 °F (36.7 °C)   TempSrc:   Oral    SpO2: 100%  100% 98%   Weight:       Height:               Scheduled Meds:   polyethylene glycol  17 g Oral Daily    famotidine  20 mg Oral Daily    heparin (porcine)  5,000 Units Subcutaneous BID    insulin lispro  0-6 Units Subcutaneous TID WC    docusate sodium  100 mg Oral BID    senna  2 tablet Oral Nightly    linagliptin  5 mg Oral Daily    febuxostat  40 mg Oral Daily    hydrALAZINE  100 mg Oral 3 times per day    therapeutic multivitamin-minerals  1 tablet Oral Daily    atorvastatin  40 mg Oral Nightly   

## 2018-02-18 LAB
GLUCOSE BLD-MCNC: 103 MG/DL (ref 75–110)
GLUCOSE BLD-MCNC: 145 MG/DL (ref 75–110)
GLUCOSE BLD-MCNC: 157 MG/DL (ref 75–110)
GLUCOSE BLD-MCNC: 182 MG/DL (ref 75–110)

## 2018-02-18 PROCEDURE — 6360000002 HC RX W HCPCS: Performed by: PHYSICAL MEDICINE & REHABILITATION

## 2018-02-18 PROCEDURE — 99231 SBSQ HOSP IP/OBS SF/LOW 25: CPT | Performed by: PHYSICAL MEDICINE & REHABILITATION

## 2018-02-18 PROCEDURE — 97116 GAIT TRAINING THERAPY: CPT

## 2018-02-18 PROCEDURE — 82947 ASSAY GLUCOSE BLOOD QUANT: CPT

## 2018-02-18 PROCEDURE — 1180000000 HC REHAB R&B

## 2018-02-18 PROCEDURE — 99231 SBSQ HOSP IP/OBS SF/LOW 25: CPT | Performed by: INTERNAL MEDICINE

## 2018-02-18 PROCEDURE — 6370000000 HC RX 637 (ALT 250 FOR IP): Performed by: PHYSICAL MEDICINE & REHABILITATION

## 2018-02-18 PROCEDURE — 97110 THERAPEUTIC EXERCISES: CPT

## 2018-02-18 PROCEDURE — 97535 SELF CARE MNGMENT TRAINING: CPT

## 2018-02-18 PROCEDURE — 97530 THERAPEUTIC ACTIVITIES: CPT

## 2018-02-18 RX ADMIN — CLONIDINE HYDROCHLORIDE 0.1 MG: 0.1 TABLET ORAL at 20:28

## 2018-02-18 RX ADMIN — HYDRALAZINE HYDROCHLORIDE 100 MG: 50 TABLET, FILM COATED ORAL at 23:05

## 2018-02-18 RX ADMIN — CLONIDINE HYDROCHLORIDE 0.1 MG: 0.1 TABLET ORAL at 08:07

## 2018-02-18 RX ADMIN — CARVEDILOL 25 MG: 25 TABLET, FILM COATED ORAL at 17:46

## 2018-02-18 RX ADMIN — OXYCODONE HYDROCHLORIDE AND ACETAMINOPHEN 1000 MG: 500 TABLET ORAL at 08:06

## 2018-02-18 RX ADMIN — LISINOPRIL 20 MG: 20 TABLET ORAL at 08:03

## 2018-02-18 RX ADMIN — HYDRALAZINE HYDROCHLORIDE 100 MG: 50 TABLET, FILM COATED ORAL at 06:29

## 2018-02-18 RX ADMIN — LINAGLIPTIN 5 MG: 5 TABLET, FILM COATED ORAL at 08:07

## 2018-02-18 RX ADMIN — POLYETHYLENE GLYCOL 3350 17 G: 17 POWDER, FOR SOLUTION ORAL at 08:04

## 2018-02-18 RX ADMIN — FEBUXOSTAT 40 MG: 40 TABLET ORAL at 08:07

## 2018-02-18 RX ADMIN — CARVEDILOL 25 MG: 25 TABLET, FILM COATED ORAL at 08:04

## 2018-02-18 RX ADMIN — FAMOTIDINE 20 MG: 20 TABLET, FILM COATED ORAL at 08:04

## 2018-02-18 RX ADMIN — LISINOPRIL 20 MG: 20 TABLET ORAL at 20:28

## 2018-02-18 RX ADMIN — DOCUSATE SODIUM 100 MG: 100 CAPSULE, LIQUID FILLED ORAL at 20:28

## 2018-02-18 RX ADMIN — ASPIRIN 81 MG 81 MG: 81 TABLET ORAL at 08:04

## 2018-02-18 RX ADMIN — HEPARIN SODIUM 5000 UNITS: 5000 INJECTION, SOLUTION INTRAVENOUS; SUBCUTANEOUS at 23:05

## 2018-02-18 RX ADMIN — MULTIPLE VITAMINS W/ MINERALS TAB 1 TABLET: TAB at 08:04

## 2018-02-18 RX ADMIN — OXYCODONE HYDROCHLORIDE AND ACETAMINOPHEN 1 TABLET: 5; 325 TABLET ORAL at 23:56

## 2018-02-18 RX ADMIN — CLONIDINE HYDROCHLORIDE 0.1 MG: 0.1 TABLET ORAL at 13:26

## 2018-02-18 RX ADMIN — FUROSEMIDE 40 MG: 40 TABLET ORAL at 08:04

## 2018-02-18 RX ADMIN — OXYCODONE HYDROCHLORIDE AND ACETAMINOPHEN 1 TABLET: 5; 325 TABLET ORAL at 08:04

## 2018-02-18 RX ADMIN — DOCUSATE SODIUM 100 MG: 100 CAPSULE, LIQUID FILLED ORAL at 13:26

## 2018-02-18 RX ADMIN — ATORVASTATIN CALCIUM 40 MG: 40 TABLET, FILM COATED ORAL at 20:28

## 2018-02-18 RX ADMIN — HEPARIN SODIUM 5000 UNITS: 5000 INJECTION, SOLUTION INTRAVENOUS; SUBCUTANEOUS at 08:00

## 2018-02-18 RX ADMIN — SENNOSIDES 17.2 MG: 8.6 TABLET, FILM COATED ORAL at 20:28

## 2018-02-18 ASSESSMENT — PAIN DESCRIPTION - PROGRESSION

## 2018-02-18 ASSESSMENT — PAIN SCALES - GENERAL
PAINLEVEL_OUTOF10: 4
PAINLEVEL_OUTOF10: 4
PAINLEVEL_OUTOF10: 3
PAINLEVEL_OUTOF10: 6

## 2018-02-18 ASSESSMENT — PAIN DESCRIPTION - LOCATION
LOCATION: FOOT
LOCATION: FOOT

## 2018-02-18 ASSESSMENT — PAIN DESCRIPTION - FREQUENCY: FREQUENCY: CONTINUOUS

## 2018-02-18 ASSESSMENT — PAIN DESCRIPTION - PAIN TYPE
TYPE: NEUROPATHIC PAIN
TYPE: NEUROPATHIC PAIN

## 2018-02-18 ASSESSMENT — PAIN DESCRIPTION - ORIENTATION
ORIENTATION: LEFT
ORIENTATION: RIGHT;LEFT

## 2018-02-18 NOTE — PROGRESS NOTES
Occupational [facility] REHABILITATION OCCUPATIONAL THERAPY  DAILY NOTE    Date: 18  Patient Name: Kelsey Xiao      Room: 3652/9220-21    MRN: [id number]   : 1940  (69 y.o.)  Gender: male      Diagnosis: Cervical Myelopathy s/pC4-C5, C5-6 ACDF on 18  Additional Pertinent Hx: h/o CVA    Restrictions  Restrictions/Precautions: General Precautions, Fall Risk  Other position/activity restrictions: 18 underwent C4-5, C5-6 ACDF for cervical myelopathy  Required Braces or Orthoses  Left Lower Extremity Brace: Wilma Foot Orthotics  Position Activity Restriction  Other position/activity restrictions: 18 underwent C4-5, C5-6 ACDF for cervical myelopathy     Subjective  Comments: Pt. cooperative and pleasant. Patient Currently in Pain: Yes  Pain Level: 4  Pain Location: Foot  Pain Orientation: Left  Restrictions/Precautions: General Precautions; Fall Risk          Objective     Balance  Sitting Balance: Modified independent   Standing Balance: Modified independent   Transfers  Stand Step Transfers: Modified independent (RW)  Sit to stand: Modified independent  Stand to sit: Modified independent  Standing Balance  Time: Am : 2-3 min. X 3   Activity: AM : functional mobility, brushing teeth , radhames-care, clothing mgmt    Sit to stand: Modified independent  Stand to sit: Modified independent  Functional Mobility  Functional - Mobility Device: Rolling Walker  Activity: To/from bathroom  Assist Level: Modified independent   Shower Transfers  Shower - Transfer From: [facility] - Transfer Type: To and From  Shower - Transfer To: Transfer tub bench  Shower - Technique: Ambulating  Shower Transfers: Supervision  Tub Transfers  Tub Transfers Comments: Pt. declined tub transfer stating that he has tub bench and he is indep.with tub transfer. OT reviews safety and fall prevention with patient . Pt. demo. good understanding.      Type of ROM/Therapeutic Exercise  Type of Safety Education & Training, Self-Care / ADL, Home Management Training  Patient Goals   Patient goals : To get stronger and go home  Short term goals  Time Frame for Short term goals: One week,   Short term goal 1: Pt. will demo. Mod.I with bed mobility. Short term goal 2: Pt. will demo. SBA with toilet transfer using RW and toileting tasks with good . Short term goal 3: Pt. will demo. Mod.I with BADL's. Short term goal 4: Pt. will tolerate 30+ min. functional activities/therapeutic ex. to promote increased indep.with ADL's and mobility. Short term goal 5: Pt. will demo. SBA with light homemgmt tasks. Short term goal 6: Pt. will tolerate 8-10 min. functional standing activities to promote increased indep.with ADL's and mobility. Long term goals  Time Frame for Long term goals : By discharge,   Long term goal 1: Pt. will demo. Mod.I with functional ADL transfers and functional mobility using appropriate AD with good . Long term goal 2: Pt. will demo. Mod.I with light homemgmt tasks.      02/18/18 1030 02/18/18 1323   OT Individual Minutes   Time In 0935 1240   Time Out 1030 1315   Minutes 55 35     Electronically signed by BRINA Sterling on 2/18/18 at 1:27 PM

## 2018-02-18 NOTE — PROGRESS NOTES
tile  Ambulation 1  Surface: level tile  Device: Rolling Walker  Other Apparatus: AFO, Left  Assistance: Contact guard assistance  Quality of Gait: slow gait, fwd flexed posture, NBOS  Distance: 150ft x3  Comments: VC's for posture and tyo stay inside walker     OT FIM:   Eatin - Patient feeds self  Groomin - Independent with all tasks using assistive device  Bathin - Able to bathe all 10 areas with setup/sup/cues  Dressing-Upper: 5 - Requires setup/supervision/cues and/or requires assist with presthesis/brace only  Dressing-Lower: 5 - Requires setup/supervision/cues and/or staff applies TEDS/prosthesis/brace only (assist only with donning marcin hose)  Toiletin - Did not occur  Toilet Transfer: 0 - Did not occur  Primary Mode: Shower  Tub Transfer: 0 - Activity does not occur  Shower Transfer: 0 - Activity does not occur (pt. declined to take a shower this date. Completed bathing sink side)     Social Interaction: 7 - Patient has appropriate behavior/relations 100% of the time  Problem Solvin - Patient independent with complex tasks  Memory: 6 - Patient requires device to recall (e.g. memory book)    Objective:  BP (!) 146/66   Pulse 65   Temp 98.1 °F (36.7 °C) (Oral)   Resp 16   Ht 5' 9\" (1.753 m)   Wt 213 lb (96.6 kg)   SpO2 100%   BMI 31.45 kg/m²  I Body mass index is 31.45 kg/m². I   Wt Readings from Last 1 Encounters:   18 213 lb (96.6 kg)      Temp (24hrs), Av.2 °F (36.8 °C), Min:98.1 °F (36.7 °C), Max:98.2 °F (36.8 °C)      Alert, no distress. Oriented ×3  Good speech and language function. Midline incision on the anterior side of the neck, clean  Lungs clear. Heart regular. Abdomen non-distended, non-tender. + for bowel sounds   No calf tenderness or edema. LE weakness and paresthesia more on the L. 4/5.  Improving. , Use of AFO left lower extremity      Medications   Scheduled Meds:   polyethylene glycol  17 g Oral Daily    famotidine  20 mg Oral Daily    heparin (porcine)  5,000 Units Subcutaneous BID    insulin lispro  0-6 Units Subcutaneous TID     docusate sodium  100 mg Oral BID    senna  2 tablet Oral Nightly    linagliptin  5 mg Oral Daily    febuxostat  40 mg Oral Daily    hydrALAZINE  100 mg Oral 3 times per day    therapeutic multivitamin-minerals  1 tablet Oral Daily    atorvastatin  40 mg Oral Nightly    cloNIDine  0.1 mg Oral TID    carvedilol  25 mg Oral BID WC    lisinopril  20 mg Oral BID    aspirin  81 mg Oral Daily    vitamin C  1,000 mg Oral Daily    furosemide  40 mg Oral Daily     Continuous Infusions:   dextrose       PRN Meds:.bisacodyl, glucose, dextrose, glucagon (rDNA), dextrose, oxyCODONE-acetaminophen, acetaminophen, magnesium hydroxide     Diagnostics:     CBC:   Recent Labs      02/17/18   0711   WBC  5.6   RBC  3.04*   HGB  9.7*   HCT  29.2*   MCV  96.1   RDW  13.6   PLT  234     BMP:   Recent Labs      02/16/18   0719  02/17/18   0711   NA  142  143   K  4.5  4.4   CL  101  103   CO2  33*  32*   BUN  31*  30*   CREATININE  1.84*  1.68*     BNP: No results for input(s): BNP in the last 72 hours. PT/INR: No results for input(s): PROTIME, INR in the last 72 hours. APTT: No results for input(s): APTT in the last 72 hours. CARDIAC ENZYMES: No results for input(s): CKMB, CKMBINDEX, TROPONINT in the last 72 hours. Invalid input(s): CKTOTAL;3  FASTING LIPID PANEL:  Lab Results   Component Value Date    CHOL 101 02/14/2018    HDL 28 (L) 02/14/2018    TRIG 152 (H) 02/14/2018     LIVER PROFILE:   Recent Labs      02/16/18   0719   AST  15   ALT  12   BILITOT  0.27*   ALKPHOS  41        I/O (24Hr):     Intake/Output Summary (Last 24 hours) at 02/18/18 1046  Last data filed at 02/17/18 1613   Gross per 24 hour   Intake              240 ml   Output                2 ml   Net              238 ml       Glu last 24 hour  Recent Labs      02/17/18   1120  02/17/18   1613  02/17/18   2006  02/18/18   0631   POCGLU  131*  169*  183*  145* No results for input(s): CLARITYU, COLORU, PHUR, SPECGRAV, PROTEINU, RBCUA, BLOODU, BACTERIA, NITRU, WBCUA, LEUKOCYTESUR, YEAST, GLUCOSEU, BILIRUBINUR in the last 72 hours. Impression/Plan:  Ambulatory and ADL dysfunction secondary to Cervical spondylosis with myelopathy:   LE weakness and neuropathy due to DM, lumbar and cervical stenoses.      1. Ambulatory and ADL dysfunction second cervical myelopathy status post ACDF, diabetic neuropathy, etc.Continue rehab efforts, steady progress, well motivated. Home goal. As per team conference on Feb 15recommendation for 2/22/18  2. Cervical myelopathymonitor incisionclean  3. 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 glucose as above  4. Nephropathy (CKD stage 3). Cont lasix and monitor for Na and cr , baseline appears to be around 1.8/1.9 creatinine, monitor, encourage fluidsstable  5. HTN - clonidine and coreg and lisinopril and hydralazine. ,  Lasix,  monitor blood pressure  6. Normocytic anemia levels of folate/B12 are within normal limits,iron panel showed normal ferritin and iron saturation. 7. Gout-  febuxostat   8. ConstipationColace, SenokotMiraLAX, s/p suppository Feb 13, passing gas, abdominal exam negative, small BM 2/14/18. Add MiraLAX, suppository tonight  9. Lumbar stenosis with neurogenic claudication s/p lumbar decompression surgery for LLE and foot drop in 2014 with Dr. Polly Jones in [facility]. Proceed with PT/OT and plan for surgery end of February at [place] per records  10. DVT prophylaxis heparin,  continue EPC cuff/teds      Cristo Cedeno MD

## 2018-02-18 NOTE — CONSULTS
kg/m². Physical Exam   Constitutional: He is cooperative. Neck: Carotid bruit is not present. No thyroid mass and no thyromegaly present. Cardiovascular: Normal rate, regular rhythm and normal heart sounds. Exam reveals no S3. No murmur heard. Pulmonary/Chest: Effort normal and breath sounds normal.   Neurological: He is alert. He has normal strength. Skin: Skin is warm and dry. No rash noted. Psychiatric: He has a normal mood and affect. DIAGNOSTICS       URINE ANALYSIS: No results found for: LABURIN     CBC:  Lab Results   Component Value Date    WBC 5.6 02/17/2018    HGB 9.7 02/17/2018     02/17/2018        BMP:    Lab Results   Component Value Date     02/17/2018    K 4.4 02/17/2018     02/17/2018    CO2 32 02/17/2018    BUN 30 02/17/2018    CREATININE 1.68 02/17/2018    GLUCOSE 163 02/17/2018      LIVER PROFILE:  Lab Results   Component Value Date    ALT 12 02/16/2018    AST 15 02/16/2018    PROT 6.5 02/16/2018    BILITOT 0.27 02/16/2018    LABALBU 3.5 02/16/2018               . Radiology ;          vitals / serial labs / CURRENT MEDS    REVIEWED CURRENT VITALS , LABS  AND MEDICATIONS  AS NOTED BELOW :           Vitals:    02/17/18 1715 02/17/18 2245 02/18/18 0629 02/18/18 0629   BP: (!) 127/52 (!) 140/64 (!) 146/66 (!) 146/66   Pulse: 88 62  65   Resp: 16   16   Temp: 98.2 °F (36.8 °C)   98.1 °F (36.7 °C)   TempSrc: Oral   Oral   SpO2:    100%   Weight:       Height:               Scheduled Meds:   polyethylene glycol  17 g Oral Daily    famotidine  20 mg Oral Daily    heparin (porcine)  5,000 Units Subcutaneous BID    insulin lispro  0-6 Units Subcutaneous TID WC    docusate sodium  100 mg Oral BID    senna  2 tablet Oral Nightly    linagliptin  5 mg Oral Daily    febuxostat  40 mg Oral Daily    hydrALAZINE  100 mg Oral 3 times per day    therapeutic multivitamin-minerals  1 tablet Oral Daily    atorvastatin  40 mg Oral Nightly    cloNIDine  0.1 mg Oral B12/Folate Panel     Standing Status:   Standing     Number of Occurrences:   1    CBC Auto Differential     Standing Status:   Standing     Number of Occurrences:   1    Iron and TIBC     Standing Status:   Standing     Number of Occurrences:   1    Ferritin     Standing Status:   Standing     Number of Occurrences:   1    CBC     Standing Status:   Standing     Number of Occurrences:   1    Basic Metabolic Panel     Standing Status:   Standing     Number of Occurrences:   1    DIET CARB CONTROL;     Standing Status:   Standing     Number of Occurrences:   1    Notify Provider     For blood glucose less than 50mg/dL or patient not alert, blood glucose less than 70mg/dL after two treatments, or for blood glucose greater than 400. Standing Status:   Standing     Number of Occurrences:   1    HYPOGLYCEMIA TREATMENT: blood glucose less than 50 mg/dL and patient  ALERT and TOLERATING PO     Give 8 ounces juice or regular soda or 2 tubes glucose gel. Repeat blood glucose in 15 minutes. If blood glucose is less than 70 mg/dL, repeat treatment and recheck blood glucose in 15 minutes x2 and notify provider. Standing Status:   Standing     Number of Occurrences:   [id number]    HYPOGLYCEMIA TREATMENT: blood glucose less than 70 mg/dL and patient ALERT and TOLERATING PO     Give 4 ounces juice or regular soda or 1 tube glucose gel. Repeat blood glucose in 15 minutes. If blood glucose is less than 70 mg/dL, repeat treatment and recheck blood glucose in 15 minutes x2. If blood glucose remains less than 70 mg/dL, notify provider. Standing Status:   Standing     Number of Occurrences:   [id number]    HYPOGLYCEMIA TREATMENT: blood glucose less than 70 mg/dL and patient NOT ALERT or NPO     Give dextrose 50% intravenous. If patient does not respond within 5 minutes, repeat dose x1. Start D5W at 100 mL/hour until ordering provider can be reached. Repeat blood glucose in 15 minutes.  If blood glucose is less than 70 Fingerstick     Standing Status:   Standing     Number of Occurrences:   1    POC Glucose Fingerstick     Standing Status:   Standing     Number of Occurrences:   1    POC Glucose Fingerstick     Standing Status:   Standing     Number of Occurrences:   1    POC Glucose Fingerstick     Standing Status:   Standing     Number of Occurrences:   1    POC Glucose Fingerstick     Standing Status:   Standing     Number of Occurrences:   1    POC Glucose Fingerstick     Standing Status:   Standing     Number of Occurrences:   1    POC Glucose Fingerstick     Standing Status:   Standing     Number of Occurrences:   1    POC Glucose Fingerstick     Standing Status:   Standing     Number of Occurrences:   1    POC Glucose Fingerstick     Standing Status:   Standing     Number of Occurrences:   1    POC Glucose Fingerstick     Standing Status:   Standing     Number of Occurrences:   1    POC Glucose Fingerstick     Standing Status:   Standing     Number of Occurrences:   1    POC Glucose Fingerstick     Standing Status:   Standing     Number of Occurrences:   1    POC Glucose Fingerstick     Standing Status:   Standing     Number of Occurrences:   1    POC Glucose Fingerstick     Standing Status:   Standing     Number of Occurrences:   1    POC Glucose Fingerstick     Standing Status:   Standing     Number of Occurrences:   1    POC Glucose Fingerstick     Standing Status:   Standing     Number of Occurrences:   1    POC Glucose Fingerstick     Standing Status:   Standing     Number of Occurrences:   1    POC Glucose Fingerstick     Standing Status:   Standing     Number of Occurrences:   1    PATIENT STATUS (DIRECT) Inpatient     Standing Status:   Standing     Number of Occurrences:   1     Order Specific Question:   Patient Class     Answer:   Inpatient [101]     Order Specific Question:   REQUIRED: Diagnosis     Answer:   Cervical myelopathy [facility] [[id number]]     Order Specific Question:   Estimated Length of

## 2018-02-18 NOTE — PROGRESS NOTES
[address]  Acute Rehabilitation Physical Therapy Progress Note    Date: 18  Patient Name: Harmony Godfrey       Room: 8835/0276-23  MRN: [id number]   Account: [de-identified]   : 1940  (69 y.o.) Gender: male        Diagnosis: Cervical Myelopathy s/pC4-C5, C5-6 ACDF on 18  Past Medical History:  has a past medical history of Cerebral artery occlusion with cerebral infarction ([facility] Chronic kidney disease; Diabetes mellitus ([facility] Hyperlipidemia; Hypertension; and Movement disorder. Past Surgical History:   has a past surgical history that includes eye surgery (); hernia repair; Dilatation, esophagus (); back surgery (2018); and back surgery (). Overall Orientation Status: Within Normal Limits  Restrictions/Precautions  Restrictions/Precautions: General Precautions; Fall Risk  Required Braces or Orthoses?: No  Required Braces or Orthoses  Left Lower Extremity Brace: Wilma Foot Orthotics  Position Activity Restriction  Other position/activity restrictions: 18 underwent C4-5, C5-6 ACDF for cervical myelopathy    Subjective: Pt reports doing much better now that he is out of bed; pt has no complaints  Comments: Pt is pleasant and cooperative with PT. Discussed need for pt's wife to come in for family training soon. Vital Signs  Patient Currently in Pain: Yes  Pain Assessment: 0-10  Pain Level: 4  Pain Type: Neuropathic pain  Pain Location: Foot  Pain Orientation: Right;Left  Pain Intervention(s): Repositioned; Ambulation/Increased activity; Distraction;Medication (see eMar)  Response to Pain Intervention: Patient Satisfied                Bed Mobility:   Bed Mobility  Bridging: Modified independent   Rolling: Independent;Rolling Left;Rolling Right  Supine to Sit: Supervision  Sit to Supine: Supervision  Scooting: Independent (to [facility])  Comment: flat mat in gym with 1 pillow  Bed mobility  Bridging: Modified independent   Scooting: Independent (to No  Other: TBD       Patient Education  New Education Provided: Importance of Upright Posture  Learner:patient  Method: demonstration and explanation       Outcome: acknowledged understanding of Importance of Upright Posture and needs reinforcement     Current Treatment Recommendations: Strengthening, Transfer Training, Endurance Training, Neuromuscular Re-education, Balance Training, Gait Training, Stair training, Functional Mobility Training    Conditions Requiring Skilled Therapeutic Intervention  Body structures, Functions, Activity limitations: Decreased functional mobility ; Decreased ROM; Decreased endurance;Decreased safe awareness;Decreased strength;Decreased balance  Assessment: Pt motivated to improve and receptive to education on posture and exercsies to improve mobility and gait   Prognosis: Excellent  Patient Education: Importance of Upright Posture  Barriers to Learning: none known  REQUIRES PT FOLLOW UP: Yes  Discharge Recommendations: Home with assist PRN;Outpatient PT    Goals  Short term goals  Time Frame for Short term goals: 5 days  Short term goal 1: Pt modified indep with bed mobility  Short term goal 2: Pt modified indep with transfers, no loss of balance  Short term goal 3: Pt amb 200 ft with rw, modified indep using AFO, demonstrating safe HARLEY throughout  Short term goal 4: Pt improve standing balance to good with B UE support  Short term goal 5: Pt negotiate 4 stairs with 1 rail and CGA  Long term goals  Time Frame for Long term goals : 10 days  Long term goal 1: Pt amb 300 ft with rw on uneven surfaces modified indep with proper postural awareness  Long term goal 2: Pt negotiate 12 stairs with left rail modified indep for community assess       02/18/18 0850 02/18/18 1415   PT Individual Minutes   Time In 0850 1415   Time Out 0935 1500   Minutes 45 45       Electronically signed by Tatiana Lazaro PTA on 2/18/18 at 4:30 PM

## 2018-02-19 LAB
GLUCOSE BLD-MCNC: 144 MG/DL (ref 75–110)
GLUCOSE BLD-MCNC: 154 MG/DL (ref 75–110)
GLUCOSE BLD-MCNC: 164 MG/DL (ref 75–110)
GLUCOSE BLD-MCNC: 186 MG/DL (ref 75–110)

## 2018-02-19 PROCEDURE — 97535 SELF CARE MNGMENT TRAINING: CPT

## 2018-02-19 PROCEDURE — 6360000002 HC RX W HCPCS: Performed by: PHYSICAL MEDICINE & REHABILITATION

## 2018-02-19 PROCEDURE — 99231 SBSQ HOSP IP/OBS SF/LOW 25: CPT | Performed by: INTERNAL MEDICINE

## 2018-02-19 PROCEDURE — 97110 THERAPEUTIC EXERCISES: CPT

## 2018-02-19 PROCEDURE — 97112 NEUROMUSCULAR REEDUCATION: CPT

## 2018-02-19 PROCEDURE — 99232 SBSQ HOSP IP/OBS MODERATE 35: CPT | Performed by: PHYSICAL MEDICINE & REHABILITATION

## 2018-02-19 PROCEDURE — 82947 ASSAY GLUCOSE BLOOD QUANT: CPT

## 2018-02-19 PROCEDURE — 6370000000 HC RX 637 (ALT 250 FOR IP): Performed by: PHYSICAL MEDICINE & REHABILITATION

## 2018-02-19 PROCEDURE — 97116 GAIT TRAINING THERAPY: CPT

## 2018-02-19 PROCEDURE — 1180000000 HC REHAB R&B

## 2018-02-19 RX ADMIN — LISINOPRIL 20 MG: 20 TABLET ORAL at 20:22

## 2018-02-19 RX ADMIN — CLONIDINE HYDROCHLORIDE 0.1 MG: 0.1 TABLET ORAL at 20:22

## 2018-02-19 RX ADMIN — FEBUXOSTAT 40 MG: 40 TABLET ORAL at 07:55

## 2018-02-19 RX ADMIN — OXYCODONE HYDROCHLORIDE AND ACETAMINOPHEN 1000 MG: 500 TABLET ORAL at 07:56

## 2018-02-19 RX ADMIN — MULTIPLE VITAMINS W/ MINERALS TAB 1 TABLET: TAB at 07:54

## 2018-02-19 RX ADMIN — CLONIDINE HYDROCHLORIDE 0.1 MG: 0.1 TABLET ORAL at 13:39

## 2018-02-19 RX ADMIN — SENNOSIDES 17.2 MG: 8.6 TABLET, FILM COATED ORAL at 20:22

## 2018-02-19 RX ADMIN — OXYCODONE HYDROCHLORIDE AND ACETAMINOPHEN 1 TABLET: 5; 325 TABLET ORAL at 16:21

## 2018-02-19 RX ADMIN — POLYETHYLENE GLYCOL 3350 17 G: 17 POWDER, FOR SOLUTION ORAL at 07:55

## 2018-02-19 RX ADMIN — INSULIN LISPRO 1 UNITS: 100 INJECTION, SOLUTION INTRAVENOUS; SUBCUTANEOUS at 11:26

## 2018-02-19 RX ADMIN — LINAGLIPTIN 5 MG: 5 TABLET, FILM COATED ORAL at 07:55

## 2018-02-19 RX ADMIN — HYDRALAZINE HYDROCHLORIDE 100 MG: 50 TABLET, FILM COATED ORAL at 13:39

## 2018-02-19 RX ADMIN — HEPARIN SODIUM 5000 UNITS: 5000 INJECTION, SOLUTION INTRAVENOUS; SUBCUTANEOUS at 20:24

## 2018-02-19 RX ADMIN — BISACODYL 10 MG: 10 SUPPOSITORY RECTAL at 20:22

## 2018-02-19 RX ADMIN — CLONIDINE HYDROCHLORIDE 0.1 MG: 0.1 TABLET ORAL at 07:56

## 2018-02-19 RX ADMIN — HEPARIN SODIUM 5000 UNITS: 5000 INJECTION, SOLUTION INTRAVENOUS; SUBCUTANEOUS at 07:56

## 2018-02-19 RX ADMIN — LISINOPRIL 20 MG: 20 TABLET ORAL at 07:54

## 2018-02-19 RX ADMIN — ASPIRIN 81 MG 81 MG: 81 TABLET ORAL at 07:54

## 2018-02-19 RX ADMIN — FUROSEMIDE 40 MG: 40 TABLET ORAL at 07:54

## 2018-02-19 RX ADMIN — OXYCODONE HYDROCHLORIDE AND ACETAMINOPHEN 1 TABLET: 5; 325 TABLET ORAL at 06:46

## 2018-02-19 RX ADMIN — FAMOTIDINE 20 MG: 20 TABLET, FILM COATED ORAL at 07:55

## 2018-02-19 RX ADMIN — DOCUSATE SODIUM 100 MG: 100 CAPSULE, LIQUID FILLED ORAL at 20:22

## 2018-02-19 RX ADMIN — HYDRALAZINE HYDROCHLORIDE 100 MG: 50 TABLET, FILM COATED ORAL at 22:50

## 2018-02-19 RX ADMIN — DOCUSATE SODIUM 100 MG: 100 CAPSULE, LIQUID FILLED ORAL at 07:54

## 2018-02-19 RX ADMIN — ATORVASTATIN CALCIUM 40 MG: 40 TABLET, FILM COATED ORAL at 20:22

## 2018-02-19 RX ADMIN — HYDRALAZINE HYDROCHLORIDE 100 MG: 50 TABLET, FILM COATED ORAL at 05:39

## 2018-02-19 RX ADMIN — CARVEDILOL 25 MG: 25 TABLET, FILM COATED ORAL at 07:54

## 2018-02-19 ASSESSMENT — PAIN DESCRIPTION - PROGRESSION

## 2018-02-19 ASSESSMENT — PAIN SCALES - GENERAL
PAINLEVEL_OUTOF10: 0
PAINLEVEL_OUTOF10: 5
PAINLEVEL_OUTOF10: 5
PAINLEVEL_OUTOF10: 3
PAINLEVEL_OUTOF10: 0

## 2018-02-19 ASSESSMENT — PAIN DESCRIPTION - LOCATION: LOCATION: BUTTOCKS

## 2018-02-19 ASSESSMENT — PAIN DESCRIPTION - PAIN TYPE: TYPE: ACUTE PAIN

## 2018-02-19 ASSESSMENT — PAIN DESCRIPTION - DESCRIPTORS: DESCRIPTORS: ACHING

## 2018-02-19 NOTE — PROGRESS NOTES
Physical Therapy  [address]  Acute Rehabilitation Physical Therapy Progress Note    Date: 18  Patient Name: Justice Haile       Room: [facility]8179-  MRN: [id number]   Account: [de-identified]   : 1940  (69 y.o.) Gender: male        Diagnosis: Cervical Myelopathy s/pC4-C5, C5-6 ACDF on 18  Past Medical History:  has a past medical history of Cerebral artery occlusion with cerebral infarction ([facility] [address] Chronic kidney disease; Diabetes mellitus ([facility] [address] Hyperlipidemia; Hypertension; and Movement disorder. Past Surgical History:   has a past surgical history that includes eye surgery (); hernia repair; Dilatation, esophagus (); back surgery (2018); and back surgery (). Overall Orientation Status: Within Normal Limits  Restrictions/Precautions  Restrictions/Precautions: General Precautions; Fall Risk  Required Braces or Orthoses?: Yes  Required Braces or Orthoses  Left Lower Extremity Brace: Wilma Foot Orthotics  Position Activity Restriction  Other position/activity restrictions: 18 underwent C4-5, C5-6 ACDF for cervical myelopathy       Comments: no pain but does experience (B) foot neuropathy    Vital Signs  Patient Currently in Pain: Denies        Oxygen Therapy  O2 Device: None (Room air)          Bed Mobility:   Bed Mobility  Bridging: Independent  Rolling: Independent;Rolling Left;Rolling Right  Supine to Sit: Supervision; Independent  Sit to Supine: Supervision; Independent  Scooting: Independent  Bed mobility  Bridging: Independent  Scooting: Independent    Transfers:  Sit to Stand: Supervision;Modified independent  Stand to sit: Supervision;Modified independent     Stand Pivot Transfers: Modified independent           Ambulation 1  Surface: level tile  Device: Rolling Walker  Other Apparatus: AFO; Left  Assistance: Supervision  Quality of Gait: Slow pace, forward flexed posture - fluctuating as pt recognizes need to stand upright, narrow HARLEY feet with touch Recommendations  Equipment Needed: No (has RW at home )  Other Comments  Comments: Reports he feels good about going home in a couple days. We have been addressing all of his concerns. Patient Education  New Education Provided:    Learner:patient  Method: demonstration and explanation       Outcome: demonstrated understanding     Current Treatment Recommendations: Strengthening, Transfer Training, Endurance Training, Neuromuscular Re-education, Balance Training, Gait Training, Stair training, Functional Mobility Training    Conditions Requiring Skilled Therapeutic Intervention  Body structures, Functions, Activity limitations: Decreased functional mobility ; Decreased ROM; Decreased endurance;Decreased safe awareness;Decreased strength;Decreased balance  Prognosis: Excellent  REQUIRES PT FOLLOW UP: Yes  Discharge Recommendations: Home with assist PRN;Outpatient PT    Goals  Short term goals  Time Frame for Short term goals: 5 days  Short term goal 1: Pt modified indep with bed mobility  Short term goal 2: Pt modified indep with transfers, no loss of balance  Short term goal 3: Pt amb 200 ft with rw, modified indep using AFO, demonstrating safe HARLEY throughout  Short term goal 4: Pt improve standing balance to good with B UE support  Short term goal 5: Pt negotiate 4 stairs with 1 rail and CGA  Long term goals  Time Frame for Long term goals : 10 days  Long term goal 1: Pt amb 300 ft with rw on uneven surfaces modified indep with proper postural awareness  Long term goal 2: Pt negotiate 12 stairs with left rail modified indep for community assess       02/19/18 0937 02/19/18 1256   PT Individual Minutes   Time In 0841 1256   Time Out 0937 1336   Minutes 56 40       Electronically signed by Autumn De Anda on 2/19/18 at 1:41 PM

## 2018-02-19 NOTE — PROGRESS NOTES
orientation-pt donned shirt inside out;able to self correct once aware)  Dressing-Lower: 5 - Requires setup/supervision/cues and/or staff applies TEDS/prosthesis/brace only (supervision c set up, A c TEDs. cues for orientation-pt donned pants on backwards;able to self correct once aware)  Toiletin - Requires setup/supervision/cues (supervision c standing at toilet)  Toilet Transfer: 0 - Did not occur (pt stood to void)  Primary Mode: Shower  Tub Transfer: 0 - Activity does not occur  Shower Transfer: 0 - Activity does not occur (pt deferred shower until next date)    Social Interaction: 7 - Patient has appropriate behavior/relations 100% of the time  Problem Solvin - Patient independent with complex tasks  Memory: 7 - Patient independent with meds/people/schedule    Assessment  Performance deficits / Impairments: Decreased functional mobility ; Decreased ADL status; Decreased balance;Decreased endurance  Prognosis: Good  Discharge Recommendations: Home with assist PRN  Activity Tolerance: Patient Tolerated treatment well  Safety Devices in place: Yes  Type of devices: Call light within reach;Nurse notified; Left in chair        Plan  Plan  Times per week: 5-7x/week  Times per day: Twice a day  Current Treatment Recommendations: Functional Mobility Training, Endurance Training, Patient/Caregiver Education & Training, Equipment Evaluation, Education, & procurement, Safety Education & Training, Self-Care / ADL, Home Management Training  Plan Comment: continue OT POC  Patient Goals   Patient goals : To get stronger and go home  Short term goals  Time Frame for Short term goals: One week,   Short term goal 1: Pt. will demo. Mod.I with bed mobility. Short term goal 2: Pt. will demo. SBA with toilet transfer using RW and toileting tasks with good . Short term goal 3: Pt. will demo. Mod.I with BADL's. Short term goal 4: Pt. will tolerate 30+ min. functional activities/therapeutic ex.  to promote increased indep.with ADL's and mobility. Short term goal 5: Pt. will demo. SBA with light homemgmt tasks. Short term goal 6: Pt. will tolerate 8-10 min. functional standing activities to promote increased indep.with ADL's and mobility. Long term goals  Time Frame for Long term goals : By discharge,   Long term goal 1: Pt. will demo. Mod.I with functional ADL transfers and functional mobility using appropriate AD with good . Long term goal 2: Pt. will demo. Mod.I with light homemgmt tasks.         02/19/18 0858 02/19/18 1450   OT Individual Minutes   Time In 6400/5162 0497   Time Out 6182/3127 1254   Minutes 8/34 17     Electronically signed by JOEL Chacon on 2/19/18 at 2:55 PM

## 2018-02-19 NOTE — PROGRESS NOTES
Nutrition Assessment    Type and Reason for Visit: Reassess    Nutrition Recommendations: Continue Carb Control diet and low calorie protein supplement BID at breakfast and dinner. Recommend to give gravy with lunch and dinner, strawberry milkshake at lunch. Malnutrition Assessment:  · Malnutrition Status: At risk for malnutrition  · Context: Acute illness or injury  · Findings of the 6 clinical characteristics of malnutrition (Minimum of 2 out of 6 clinical characteristics is required to make the diagnosis of moderate or severe Protein Calorie Malnutrition based on AND/ASPEN Guidelines):  1. Energy Intake-Less than or equal to 75%, greater than 7 days    2. Weight Loss-No significant weight loss,    3. Fat Loss-No significant subcutaneous fat loss,    4. Muscle Loss-No significant muscle mass loss,    5. Fluid Accumulation-No significant fluid accumulation,      Nutrition Diagnosis:   · Problem: Inadequate oral intake  · Etiology: related to  (Surgery, decreased appetite)     Signs and symptoms:  as evidenced by Diet history of poor intake    Nutrition Assessment:  · Subjective Assessment: Patient states he has good appetite but has difficulty swallowing meats, no problem chewing. He said it's easier for him to swallow foods like tilapia, tuna casserole, cottage cheese. mac & cheese. Writer asked if he would like to have gravy with meats. Patient agreed to try it. Patient's lunch tray was delivered while writer was in the room. Pt. ordered fruit plate with cottage cheese.   · Nutrition-Focused Physical Findings: Constipation, loss of appetite  · Wound Type: Surgical Wound  · Current Nutrition Therapies:  · Oral Diet Orders: Carb Control 4 Carbs/Meal   · Oral Diet intake: %  · Oral Nutrition Supplement (ONS) Orders: Low Calorie, High Protein  · ONS intake: 51-75%  · Anthropometric Measures:  · Ht: 5' 9\" (175.3 cm)   · Current Body Wt: 212 lb 15.4 oz (96.6 kg)  · Admission Body Wt: 212 lb 15.4 oz (96.6

## 2018-02-19 NOTE — PATIENT CARE CONFERENCE
History of inadequate oral intake r/t surgery and swallowing diffulty, PO intale %, POC glucise 168. Please see nutrition note for details.     SOCIAL WORK ASSESSMENT  Assessment: wife  Pre-Admission Status:  Lives With: Spouse  Type of Home: House  Home Layout: One level  Home Access: Stairs to enter with rails  Entrance Stairs - Number of Steps: 3  Entrance Stairs - Rails: Left  Bathroom Shower/Tub: Tub/Shower unit, Shower chair with back  Bathroom Toilet: Handicap height  Bathroom Equipment: Grab bars in shower  Home Equipment: Cane, Rolling walker, 4 wheeled walker  ADL Assistance: [address]: Needs assistance (shares with wife)  Homemaking Responsibilities: Yes  Meal Prep Responsibility: No (spouse does)  Laundry Responsibility: No (Spouse does)  Cleaning Responsibility: Secondary  Shopping Responsibility: Secondary  Ambulation Assistance: Independent (uses single cane and AFO left LE)  Transfer Assistance: Independent  Active : Yes  Mode of Transportation: Car  IADL Comments: Pt uses L AFO at all times, x years  Additional Comments: Limited distances with ambulation prior to surgery, used cane primarily, pushed cart or scooter in stores     Family Education: Need to make contact with family to initiate education    Risk for Readmission: Low <10   Readmission Risk              Readmission Risk:        9.5       Age 72 or Greater:  1    Admitted from SNF or Requires Paid or Family Care:  0    Currently has CHF,COPD,ARF,CRI,or is on dialysis:  0    Takes more than 5 Prescription Medications:  4    Takes Digoxin,Insulin,Anticoagulants,Narcotics or ASA/Plavix:  [address] in Past 12 Months:  0    On Disability:  0    Patient Considers own Health:  2.5        Critical Items:     Problem / Barrier Intervention / Plan  Results   Altered ability to care for self  Training in use of devices and modified techniques to increase safety and independence in self care tasks

## 2018-02-19 NOTE — PLAN OF CARE
Problem: Falls - Risk of  Goal: Absence of falls  Outcome: Ongoing  Patient is alert and oriented and remains free from falls this shift. Bed is locked and in lowest position. Call light is within reach and patient is using appropriately. Problem: Pain:  Goal: Control of acute pain  Control of acute pain   Outcome: Ongoing  Adequate pain control maintained this shift. Medicated per PRN orders. See MAR.

## 2018-02-20 LAB
GLUCOSE BLD-MCNC: 118 MG/DL (ref 75–110)
GLUCOSE BLD-MCNC: 155 MG/DL (ref 75–110)
GLUCOSE BLD-MCNC: 165 MG/DL (ref 75–110)
GLUCOSE BLD-MCNC: 168 MG/DL (ref 75–110)

## 2018-02-20 PROCEDURE — 99231 SBSQ HOSP IP/OBS SF/LOW 25: CPT | Performed by: INTERNAL MEDICINE

## 2018-02-20 PROCEDURE — 6370000000 HC RX 637 (ALT 250 FOR IP): Performed by: PHYSICAL MEDICINE & REHABILITATION

## 2018-02-20 PROCEDURE — 97530 THERAPEUTIC ACTIVITIES: CPT

## 2018-02-20 PROCEDURE — 6360000002 HC RX W HCPCS: Performed by: PHYSICAL MEDICINE & REHABILITATION

## 2018-02-20 PROCEDURE — 82947 ASSAY GLUCOSE BLOOD QUANT: CPT

## 2018-02-20 PROCEDURE — 1180000000 HC REHAB R&B

## 2018-02-20 PROCEDURE — 97150 GROUP THERAPEUTIC PROCEDURES: CPT

## 2018-02-20 PROCEDURE — 97116 GAIT TRAINING THERAPY: CPT

## 2018-02-20 PROCEDURE — 99232 SBSQ HOSP IP/OBS MODERATE 35: CPT | Performed by: PHYSICAL MEDICINE & REHABILITATION

## 2018-02-20 PROCEDURE — 97535 SELF CARE MNGMENT TRAINING: CPT

## 2018-02-20 PROCEDURE — 97110 THERAPEUTIC EXERCISES: CPT

## 2018-02-20 RX ORDER — FUROSEMIDE 40 MG/1
40 TABLET ORAL DAILY
Qty: 60 TABLET | Refills: 3 | Status: ON HOLD | OUTPATIENT
Start: 2018-02-21 | End: 2018-05-23 | Stop reason: HOSPADM

## 2018-02-20 RX ORDER — ATORVASTATIN CALCIUM 40 MG/1
40 TABLET, FILM COATED ORAL NIGHTLY
Qty: 30 TABLET | Refills: 3 | Status: SHIPPED | OUTPATIENT
Start: 2018-02-20

## 2018-02-20 RX ORDER — LISINOPRIL 20 MG/1
20 TABLET ORAL 2 TIMES DAILY
Qty: 30 TABLET | Refills: 3 | Status: ON HOLD | OUTPATIENT
Start: 2018-02-20 | End: 2018-05-23 | Stop reason: HOSPADM

## 2018-02-20 RX ORDER — FAMOTIDINE 20 MG/1
20 TABLET, FILM COATED ORAL DAILY
Qty: 60 TABLET | Refills: 3 | Status: SHIPPED | OUTPATIENT
Start: 2018-02-21

## 2018-02-20 RX ORDER — CARVEDILOL 25 MG/1
25 TABLET ORAL 2 TIMES DAILY WITH MEALS
Qty: 60 TABLET | Refills: 3 | Status: SHIPPED | OUTPATIENT
Start: 2018-02-20

## 2018-02-20 RX ORDER — HYDRALAZINE HYDROCHLORIDE 100 MG/1
100 TABLET, FILM COATED ORAL EVERY 8 HOURS SCHEDULED
Qty: 90 TABLET | Refills: 3 | Status: SHIPPED | OUTPATIENT
Start: 2018-02-20 | End: 2018-02-21

## 2018-02-20 RX ORDER — ASPIRIN 81 MG/1
81 TABLET, CHEWABLE ORAL DAILY
Qty: 30 TABLET | Refills: 3 | Status: ON HOLD | OUTPATIENT
Start: 2018-02-21 | End: 2018-05-22 | Stop reason: HOSPADM

## 2018-02-20 RX ORDER — FEBUXOSTAT 40 MG/1
40 TABLET, FILM COATED ORAL DAILY
Qty: 30 TABLET | Refills: 0 | Status: SHIPPED | OUTPATIENT
Start: 2018-02-21

## 2018-02-20 RX ORDER — POLYETHYLENE GLYCOL 3350 17 G/17G
17 POWDER, FOR SOLUTION ORAL DAILY
Qty: 527 G | Refills: 1 | Status: SHIPPED | OUTPATIENT
Start: 2018-02-21 | End: 2018-03-23

## 2018-02-20 RX ORDER — OXYCODONE HYDROCHLORIDE AND ACETAMINOPHEN 5; 325 MG/1; MG/1
1 TABLET ORAL EVERY 8 HOURS PRN
Qty: 20 TABLET | Refills: 0 | Status: SHIPPED | OUTPATIENT
Start: 2018-02-20 | End: 2018-02-27

## 2018-02-20 RX ORDER — CLONIDINE HYDROCHLORIDE 0.1 MG/1
0.1 TABLET ORAL 3 TIMES DAILY
Qty: 60 TABLET | Refills: 3 | Status: ON HOLD | OUTPATIENT
Start: 2018-02-20 | End: 2018-05-23 | Stop reason: HOSPADM

## 2018-02-20 RX ORDER — PSEUDOEPHEDRINE HCL 30 MG
100 TABLET ORAL 2 TIMES DAILY
Qty: 60 CAPSULE | Refills: 0 | Status: ON HOLD | OUTPATIENT
Start: 2018-02-20 | End: 2018-05-22 | Stop reason: HOSPADM

## 2018-02-20 RX ADMIN — CLONIDINE HYDROCHLORIDE 0.1 MG: 0.1 TABLET ORAL at 13:31

## 2018-02-20 RX ADMIN — HYDRALAZINE HYDROCHLORIDE 100 MG: 50 TABLET, FILM COATED ORAL at 13:31

## 2018-02-20 RX ADMIN — LISINOPRIL 20 MG: 20 TABLET ORAL at 20:32

## 2018-02-20 RX ADMIN — OXYCODONE HYDROCHLORIDE AND ACETAMINOPHEN 1 TABLET: 5; 325 TABLET ORAL at 07:42

## 2018-02-20 RX ADMIN — OXYCODONE HYDROCHLORIDE AND ACETAMINOPHEN 1 TABLET: 5; 325 TABLET ORAL at 17:00

## 2018-02-20 RX ADMIN — CARVEDILOL 25 MG: 25 TABLET, FILM COATED ORAL at 07:42

## 2018-02-20 RX ADMIN — LISINOPRIL 20 MG: 20 TABLET ORAL at 07:42

## 2018-02-20 RX ADMIN — ATORVASTATIN CALCIUM 40 MG: 40 TABLET, FILM COATED ORAL at 20:32

## 2018-02-20 RX ADMIN — LINAGLIPTIN 5 MG: 5 TABLET, FILM COATED ORAL at 07:42

## 2018-02-20 RX ADMIN — CARVEDILOL 25 MG: 25 TABLET, FILM COATED ORAL at 17:00

## 2018-02-20 RX ADMIN — SENNOSIDES 17.2 MG: 8.6 TABLET, FILM COATED ORAL at 20:32

## 2018-02-20 RX ADMIN — DOCUSATE SODIUM 100 MG: 100 CAPSULE, LIQUID FILLED ORAL at 20:32

## 2018-02-20 RX ADMIN — MAGNESIUM HYDROXIDE 30 ML: 400 SUSPENSION ORAL at 20:40

## 2018-02-20 RX ADMIN — HYDRALAZINE HYDROCHLORIDE 100 MG: 50 TABLET, FILM COATED ORAL at 22:07

## 2018-02-20 RX ADMIN — MULTIPLE VITAMINS W/ MINERALS TAB 1 TABLET: TAB at 07:42

## 2018-02-20 RX ADMIN — FAMOTIDINE 20 MG: 20 TABLET, FILM COATED ORAL at 07:42

## 2018-02-20 RX ADMIN — ASPIRIN 81 MG 81 MG: 81 TABLET ORAL at 07:42

## 2018-02-20 RX ADMIN — HEPARIN SODIUM 5000 UNITS: 5000 INJECTION, SOLUTION INTRAVENOUS; SUBCUTANEOUS at 20:32

## 2018-02-20 RX ADMIN — DOCUSATE SODIUM 100 MG: 100 CAPSULE, LIQUID FILLED ORAL at 07:41

## 2018-02-20 RX ADMIN — FEBUXOSTAT 40 MG: 40 TABLET ORAL at 07:42

## 2018-02-20 RX ADMIN — HEPARIN SODIUM 5000 UNITS: 5000 INJECTION, SOLUTION INTRAVENOUS; SUBCUTANEOUS at 07:41

## 2018-02-20 RX ADMIN — INSULIN LISPRO 1 UNITS: 100 INJECTION, SOLUTION INTRAVENOUS; SUBCUTANEOUS at 07:41

## 2018-02-20 RX ADMIN — POLYETHYLENE GLYCOL 3350 17 G: 17 POWDER, FOR SOLUTION ORAL at 07:41

## 2018-02-20 RX ADMIN — FUROSEMIDE 40 MG: 40 TABLET ORAL at 07:42

## 2018-02-20 RX ADMIN — CLONIDINE HYDROCHLORIDE 0.1 MG: 0.1 TABLET ORAL at 20:33

## 2018-02-20 RX ADMIN — HYDRALAZINE HYDROCHLORIDE 100 MG: 50 TABLET, FILM COATED ORAL at 06:09

## 2018-02-20 RX ADMIN — CLONIDINE HYDROCHLORIDE 0.1 MG: 0.1 TABLET ORAL at 07:42

## 2018-02-20 RX ADMIN — OXYCODONE HYDROCHLORIDE AND ACETAMINOPHEN 1000 MG: 500 TABLET ORAL at 07:42

## 2018-02-20 ASSESSMENT — PAIN DESCRIPTION - PROGRESSION

## 2018-02-20 ASSESSMENT — PAIN SCALES - GENERAL
PAINLEVEL_OUTOF10: 3
PAINLEVEL_OUTOF10: 4
PAINLEVEL_OUTOF10: 0
PAINLEVEL_OUTOF10: 2

## 2018-02-20 ASSESSMENT — PAIN DESCRIPTION - PAIN TYPE: TYPE: NEUROPATHIC PAIN

## 2018-02-20 NOTE — CONSULTS
kg/m². Physical Exam   Constitutional: He is cooperative. Neck: Carotid bruit is not present. No thyroid mass and no thyromegaly present. Cardiovascular: Normal rate, regular rhythm and normal heart sounds. Exam reveals no S3. No murmur heard. Pulmonary/Chest: Effort normal and breath sounds normal.   Neurological: He is alert. He has normal strength. Skin: Skin is warm and dry. No rash noted. Psychiatric: He has a normal mood and affect. DIAGNOSTICS       URINE ANALYSIS: No results found for: LABURIN     CBC:  Lab Results   Component Value Date    WBC 5.6 02/17/2018    HGB 9.7 02/17/2018     02/17/2018        BMP:    Lab Results   Component Value Date     02/17/2018    K 4.4 02/17/2018     02/17/2018    CO2 32 02/17/2018    BUN 30 02/17/2018    CREATININE 1.68 02/17/2018    GLUCOSE 163 02/17/2018      LIVER PROFILE:  Lab Results   Component Value Date    ALT 12 02/16/2018    AST 15 02/16/2018    PROT 6.5 02/16/2018    BILITOT 0.27 02/16/2018    LABALBU 3.5 02/16/2018               . Radiology ;          vitals / serial labs / CURRENT MEDS    REVIEWED CURRENT VITALS , LABS  AND MEDICATIONS  AS NOTED BELOW :           Vitals:    02/19/18 1931 02/19/18 2250 02/20/18 0608 02/20/18 1330   BP: (!) 146/66 134/64 (!) 148/63 (!) 126/59   Pulse: 58  59    Resp: 16  16    Temp: 98.2 °F (36.8 °C)  98.9 °F (37.2 °C)    TempSrc: Oral  Oral    SpO2: 99%  95%    Weight:       Height:               Scheduled Meds:   polyethylene glycol  17 g Oral Daily    famotidine  20 mg Oral Daily    heparin (porcine)  5,000 Units Subcutaneous BID    insulin lispro  0-6 Units Subcutaneous TID WC    docusate sodium  100 mg Oral BID    senna  2 tablet Oral Nightly    linagliptin  5 mg Oral Daily    febuxostat  40 mg Oral Daily    hydrALAZINE  100 mg Oral 3 times per day    therapeutic multivitamin-minerals  1 tablet Oral Daily    atorvastatin  40 mg Oral Nightly    cloNIDine  0.1 mg Oral TID  carvedilol  25 mg Oral BID     lisinopril  20 mg Oral BID    aspirin  81 mg Oral Daily    vitamin C  1,000 mg Oral Daily    furosemide  40 mg Oral Daily     Continuous Infusions:   dextrose       PRN Meds:bisacodyl, glucose, dextrose, glucagon (rDNA), dextrose, oxyCODONE-acetaminophen, acetaminophen, magnesium hydroxide       Recent Labs      02/19/18   1110  02/19/18   1620  02/19/18   2015  02/20/18   0608  02/20/18   1105   POCGLU  186*  144*  164*  168*  155*     Lab Results   Component Value Date    CREATININE 1.68 02/17/2018     Lab Results   Component Value Date    LABA1C 6.6 02/13/2018     Lab Results   Component Value Date    POCGLU 155 02/20/2018    POCGLU 168 02/20/2018    POCGLU 164 02/19/2018    POCGLU 144 02/19/2018    POCGLU 186 02/19/2018     Vitals:    02/19/18 1931 02/19/18 2250 02/20/18 0608 02/20/18 1330   BP: (!) 146/66 134/64 (!) 148/63 (!) 126/59   Pulse: 58  59    Resp: 16  16    Temp: 98.2 °F (36.8 °C)  98.9 °F (37.2 °C)    TempSrc: Oral  Oral    SpO2: 99%  95%    Weight:       Height:           ASSESSMENT and PLAN      Principal Problem:    Cervical myelopathy (HCC)  Active Problems:    H/O excision of lamina of cervical vertebra for decompression of spinal cord    Paraparesis (HCC)  improved      Type 2 diabetes mellitus without complication, with long-term current use of insulin (HCC)  Good Control . Continue present plan. Essential hypertension  Good Control . Continue present plan. Resolved Problems:    * No resolved hospital problems. *         Treatment  reviewed . Labs reviewed . Medical condition stable . Continue current medications for medical conditions . Ok to discharge home from medical perspective , when rehab goals met  2/20/2018       Thanks for consulting us . Will monitor vitals and clinical course , and  Optimize therapy  as needed .        MD MORALES Patel [facility]  [address]  [address], [place] [id number].   Phone [phone]   Fax: [phone]  Answering Service: [phone]

## 2018-02-20 NOTE — CONSULTS
TID    carvedilol  25 mg Oral BID     lisinopril  20 mg Oral BID    aspirin  81 mg Oral Daily    vitamin C  1,000 mg Oral Daily    furosemide  40 mg Oral Daily     Continuous Infusions:   dextrose       PRN Meds:bisacodyl, glucose, dextrose, glucagon (rDNA), dextrose, oxyCODONE-acetaminophen, acetaminophen, magnesium hydroxide       Recent Labs      02/18/18   2022  02/19/18   0650  02/19/18   1110  02/19/18   1620  02/19/18 2015   POCGLU  182*  154*  186*  144*  164*     Lab Results   Component Value Date    CREATININE 1.68 02/17/2018     Lab Results   Component Value Date    LABA1C 6.6 02/13/2018     Lab Results   Component Value Date    POCGLU 164 02/19/2018    POCGLU 144 02/19/2018    POCGLU 186 02/19/2018    POCGLU 154 02/19/2018    POCGLU 182 02/18/2018     Vitals:    02/19/18 0540 02/19/18 1339 02/19/18 1621 02/19/18 1931   BP: (!) 153/55 (!) 112/54 (!) 112/54 (!) 146/66   Pulse: 59   58   Resp: 16   16   Temp: 97.5 °F (36.4 °C)   98.2 °F (36.8 °C)   TempSrc: Oral   Oral   SpO2: 97%   99%   Weight:       Height:           ASSESSMENT and PLAN      Principal Problem:    Cervical myelopathy (HCC)  Active Problems:    H/O excision of lamina of cervical vertebra for decompression of spinal cord    Paraparesis (HCC)  improved      Type 2 diabetes mellitus without complication, with long-term current use of insulin (HCC)  Good Control . Continue present plan. Essential hypertension  Good Control . Continue present plan. Resolved Problems:    * No resolved hospital problems. *         Treatment  reviewed . Labs reviewed . Medical condition stable . Continue current medications for medical conditions . 2/19/2018       Thanks for consulting us . Will monitor vitals and clinical course , and  Optimize therapy  as needed . MD MORALES Liz [address], [address] [place] [address].    Phone [phone]   Fax: [phone]  Answering

## 2018-02-20 NOTE — PROGRESS NOTES
Physical Medicine & Rehabilitation  Progress Note    2/20/2018 4:18 PM     CC: Ambulatory and ADL dysfunction due to cervical myelopathy, neurogenic claudication and neuropathy status post ACDF C4 through C6 on Feb 6th at [email or web address]    Subjective:   No complaints. Feels well. Positive BM. Ray for discharge tomorrow. Wife present. Questions answered      ROS:  Denies fevers, chills, sweats. No chest pain, palpitations, lightheadedness. Denies coughing, wheezing or shortness of breath. Denies abdominal pain, nausea, diarrhea or  constipation. No new areas of joint pain. Denies new areas of numbness or weakness. Denies new anxiety or depression issues. No new skin problems. Rehabilitation:     See team conference note    PT:  Restrictions/Precautions: General Precautions, Fall Risk  Other position/activity restrictions: 2/6/18 underwent C4-5, C5-6 ACDF for cervical myelopathy  Required Braces or Orthoses  Left Lower Extremity Brace:  Wilma Foot Orthotics   Transfers  Sit to Stand: Supervision, Modified independent  Stand to sit: Supervision, Modified independent  Bed to Chair: Supervision  Stand Pivot Transfers: Modified independent  Lateral Transfers: Stand by assistance  Comment: standing with RW  WB Status: FWB  Ambulation 1  Surface: level tile  Device: Rolling Walker  Other Apparatus: AFO, Left  Assistance: Supervision  Quality of Gait: Slow pace, forward flexed posture - fluctuating as pt recognizes need to stand upright, narrow HARLEY feet with touch at times, minimal heel/toe gait  Distance: 200ft  Comments: vc's for upright  posture, vc's to widen HARLEY, vc's for increased heel/toe gait to prevent tripping    Transfers  Sit to Stand: Supervision, Modified independent  Stand to sit: Supervision, Modified independent  Bed to Chair: Supervision  Stand Pivot Transfers: Modified independent  Lateral Transfers: Stand by assistance  Comment: standing with RW  Ambulation  Ambulation?: Yes  WB Status: FWB  More Ambulation?: Yes  Ambulation 1  Surface: level tile  Device: Rolling Walker  Other Apparatus: AFO, Left  Assistance: Supervision  Quality of Gait: Slow pace, forward flexed posture - fluctuating as pt recognizes need to stand upright, narrow HARLEY feet with touch at times, minimal heel/toe gait  Distance: 200ft  Comments: vc's for upright  posture, vc's to widen HARLEY, vc's for increased heel/toe gait to prevent tripping    Surface: level tile  Ambulation 1  Surface: level tile  Device: Rolling Walker  Other Apparatus: AFO, Left  Assistance: Supervision  Quality of Gait: Slow pace, forward flexed posture - fluctuating as pt recognizes need to stand upright, narrow HARLEY feet with touch at times, minimal heel/toe gait  Distance: 200ft  Comments: vc's for upright  posture, vc's to widen HARLEY, vc's for increased heel/toe gait to prevent tripping        Objective:  BP (!) 126/59   Pulse 59   Temp 98.9 °F (37.2 °C) (Oral)   Resp 16   Ht 5' 9\" (1.753 m)   Wt 213 lb (96.6 kg)   SpO2 95%   BMI 31.45 kg/m²  I Body mass index is 31.45 kg/m². I   Wt Readings from Last 1 Encounters:   18 213 lb (96.6 kg)      Temp (24hrs), Av.6 °F (37 °C), Min:98.2 °F (36.8 °C), Max:98.9 °F (37.2 °C)      Alert, no distress. Oriented ×3  Good speech and language function. Midline incision on the anterior side of the neck, clean  Lungs clear. Heart regular. Abdomen non-distended, non-tender. + for bowel sounds   No calf tenderness or edema. LE weakness and paresthesia more on the L. 4/5.  Improving. , Use of AFO left lower extremity      Medications   Scheduled Meds:   polyethylene glycol  17 g Oral Daily    famotidine  20 mg Oral Daily    heparin (porcine)  5,000 Units Subcutaneous BID    insulin lispro  0-6 Units Subcutaneous TID WC    docusate sodium  100 mg Oral BID    senna  2 tablet Oral Nightly    linagliptin  5 mg Oral Daily    febuxostat  40 mg Oral Daily    hydrALAZINE  100 mg Oral 3 times per day    therapeutic

## 2018-02-20 NOTE — PROGRESS NOTES
pace, fwd flexed posture  Distance: 50 ft     Stairs/Curb  Stairs?: Yes  Stairs  # Steps : 12 (AM and PM (family training))  Stairs Height: 6\"  Rails: Left ascending  Device: No Device  Other Apparatus: AFO; Left  Assistance: Supervision  Comment: pt ascended stairs using (B) UE's on (L) HR and decended only using 1 UE on HR              FIMS:      Transfers  Bed, Chair, Wheel Chair: 6 - Requires assistive device (slide rail)   Locomotion  Primary Mode: Walk  Distance Walked: 200 ft  Walk: 5 - Supervision Requires standby supervision or cuing to walk/operate wheelchair at least 150 feet  Stairs: 5- Supervision Requires supervision(e.g., standing by, cuing, or coaxing) to go up and down one flight of stairs       BALANCE Posture: Fair  Sitting - Static: Good  Sitting - Dynamic: Good  Standing - Static: Good  Standing - Dynamic: Fair;+    EXERCISES    Other exercises?: Yes  Other exercises 1: Supine bilat LE therex, x15 2lb ankle weight  Other exercises 2: seated (B) LE 2# x 20  Other exercises 3: seated green t-band x 20  Other exercises 4: standing (B) LE ex x 15  Other exercises 5: standing step tap 6\" box step x 1min-21 taps  Other exercises 6: balloon bat x 2min  Other exercises 7: NU-step L5 x 15min  Other exercises 8: sit<>stand from EOB, no UE's x 10  Other exercises 9: Family education           Activity Tolerance: Patient Tolerated treatment well  PT Equipment Recommendations  Equipment Needed: No (has RW at home )       Patient Education  New Education Provided:  Family training.   Learner:patient and significant other  Method: demonstration and explanation       Outcome: demonstrated understanding and asked questions     Current Treatment Recommendations: Strengthening, Transfer Training, Endurance Training, Neuromuscular Re-education, Balance Training, Gait Training, Stair training, Functional Mobility Training    Conditions Requiring Skilled Therapeutic Intervention  Body structures, Functions, Activity

## 2018-02-20 NOTE — PLAN OF CARE
Problem: Falls - Risk of  Goal: Absence of falls  Outcome: Ongoing  Pt absent of falls during shift. Bed locked and in lowest position, nonlsip footwear on while out of bed, call light in reach, and pt is up with assistance. Problem: Pain:  Goal: Pain level will decrease  Pain level will decrease   Outcome: Ongoing  Pt pain level assessed throughout shift. Pain treated as needed.  Pt states pain is still there but improving

## 2018-02-21 VITALS
SYSTOLIC BLOOD PRESSURE: 136 MMHG | HEART RATE: 63 BPM | WEIGHT: 213 LBS | BODY MASS INDEX: 31.55 KG/M2 | HEIGHT: 69 IN | TEMPERATURE: 98.4 F | RESPIRATION RATE: 18 BRPM | DIASTOLIC BLOOD PRESSURE: 71 MMHG | OXYGEN SATURATION: 98 %

## 2018-02-21 LAB
GLUCOSE BLD-MCNC: 139 MG/DL (ref 75–110)
GLUCOSE BLD-MCNC: 171 MG/DL (ref 75–110)

## 2018-02-21 PROCEDURE — 6360000002 HC RX W HCPCS: Performed by: PHYSICAL MEDICINE & REHABILITATION

## 2018-02-21 PROCEDURE — 97150 GROUP THERAPEUTIC PROCEDURES: CPT

## 2018-02-21 PROCEDURE — 97116 GAIT TRAINING THERAPY: CPT

## 2018-02-21 PROCEDURE — 99239 HOSP IP/OBS DSCHRG MGMT >30: CPT | Performed by: PHYSICAL MEDICINE & REHABILITATION

## 2018-02-21 PROCEDURE — 6370000000 HC RX 637 (ALT 250 FOR IP): Performed by: PHYSICAL MEDICINE & REHABILITATION

## 2018-02-21 PROCEDURE — 97110 THERAPEUTIC EXERCISES: CPT

## 2018-02-21 PROCEDURE — 82947 ASSAY GLUCOSE BLOOD QUANT: CPT

## 2018-02-21 PROCEDURE — 97530 THERAPEUTIC ACTIVITIES: CPT

## 2018-02-21 RX ORDER — HYDRALAZINE HYDROCHLORIDE 100 MG/1
50 TABLET, FILM COATED ORAL 3 TIMES DAILY
Qty: 90 TABLET | Refills: 3 | Status: ON HOLD | OUTPATIENT
Start: 2018-02-21 | End: 2018-05-23 | Stop reason: HOSPADM

## 2018-02-21 RX ADMIN — CARVEDILOL 25 MG: 25 TABLET, FILM COATED ORAL at 07:54

## 2018-02-21 RX ADMIN — DOCUSATE SODIUM 100 MG: 100 CAPSULE, LIQUID FILLED ORAL at 07:54

## 2018-02-21 RX ADMIN — OXYCODONE HYDROCHLORIDE AND ACETAMINOPHEN 1 TABLET: 5; 325 TABLET ORAL at 00:40

## 2018-02-21 RX ADMIN — ASPIRIN 81 MG 81 MG: 81 TABLET ORAL at 07:54

## 2018-02-21 RX ADMIN — CLONIDINE HYDROCHLORIDE 0.1 MG: 0.1 TABLET ORAL at 07:54

## 2018-02-21 RX ADMIN — INSULIN LISPRO 1 UNITS: 100 INJECTION, SOLUTION INTRAVENOUS; SUBCUTANEOUS at 01:00

## 2018-02-21 RX ADMIN — OXYCODONE HYDROCHLORIDE AND ACETAMINOPHEN 1000 MG: 500 TABLET ORAL at 07:54

## 2018-02-21 RX ADMIN — MULTIPLE VITAMINS W/ MINERALS TAB 1 TABLET: TAB at 07:54

## 2018-02-21 RX ADMIN — HYDRALAZINE HYDROCHLORIDE 100 MG: 50 TABLET, FILM COATED ORAL at 14:27

## 2018-02-21 RX ADMIN — LINAGLIPTIN 5 MG: 5 TABLET, FILM COATED ORAL at 07:54

## 2018-02-21 RX ADMIN — HEPARIN SODIUM 5000 UNITS: 5000 INJECTION, SOLUTION INTRAVENOUS; SUBCUTANEOUS at 07:54

## 2018-02-21 RX ADMIN — FAMOTIDINE 20 MG: 20 TABLET, FILM COATED ORAL at 07:54

## 2018-02-21 RX ADMIN — OXYCODONE HYDROCHLORIDE AND ACETAMINOPHEN 1 TABLET: 5; 325 TABLET ORAL at 07:54

## 2018-02-21 RX ADMIN — FEBUXOSTAT 40 MG: 40 TABLET ORAL at 07:54

## 2018-02-21 RX ADMIN — LISINOPRIL 20 MG: 20 TABLET ORAL at 07:54

## 2018-02-21 RX ADMIN — FUROSEMIDE 40 MG: 40 TABLET ORAL at 07:54

## 2018-02-21 RX ADMIN — OXYCODONE HYDROCHLORIDE AND ACETAMINOPHEN 1 TABLET: 5; 325 TABLET ORAL at 14:27

## 2018-02-21 RX ADMIN — CLONIDINE HYDROCHLORIDE 0.1 MG: 0.1 TABLET ORAL at 14:27

## 2018-02-21 RX ADMIN — POLYETHYLENE GLYCOL 3350 17 G: 17 POWDER, FOR SOLUTION ORAL at 07:54

## 2018-02-21 RX ADMIN — HYDRALAZINE HYDROCHLORIDE 100 MG: 50 TABLET, FILM COATED ORAL at 05:22

## 2018-02-21 ASSESSMENT — PAIN SCALES - GENERAL
PAINLEVEL_OUTOF10: 0
PAINLEVEL_OUTOF10: 5
PAINLEVEL_OUTOF10: 4
PAINLEVEL_OUTOF10: 2
PAINLEVEL_OUTOF10: 4
PAINLEVEL_OUTOF10: 3
PAINLEVEL_OUTOF10: 3

## 2018-02-21 ASSESSMENT — PAIN DESCRIPTION - ORIENTATION: ORIENTATION: ANTERIOR;RIGHT

## 2018-02-21 ASSESSMENT — PAIN DESCRIPTION - PAIN TYPE: TYPE: ACUTE PAIN;SURGICAL PAIN

## 2018-02-21 ASSESSMENT — PAIN DESCRIPTION - LOCATION: LOCATION: NECK

## 2018-02-21 NOTE — PROGRESS NOTES
pace with no LOB; Cues for posture. Distance: 200ft  AM and PM  Comments: vc's for upright  posture, vc's to widen HARLEY, vc's for increased heel/toe gait to prevent tripping    Surface: level tile  Ambulation 1  Surface: level tile  Device: Rolling Walker  Other Apparatus: AFO, Left  Assistance: Supervision  Quality of Gait: Slow steady pace with no LOB; Cues for posture. Distance: 200ft  AM and PM  Comments: vc's for upright  posture, vc's to widen HARLEY, vc's for increased heel/toe gait to prevent tripping        Objective:  /71   Pulse 63   Temp 98.4 °F (36.9 °C) (Oral)   Resp 18   Ht 5' 9\" (1.753 m)   Wt 213 lb (96.6 kg)   SpO2 98%   BMI 31.45 kg/m²  I Body mass index is 31.45 kg/m². I   Wt Readings from Last 1 Encounters:   18 213 lb (96.6 kg)      Temp (24hrs), Av.4 °F (36.9 °C), Min:98.4 °F (36.9 °C), Max:98.4 °F (36.9 °C)      Alert, no distress. Oriented ×3  Good speech and language function. Midline incision on the anterior side of the neck, clean  Lungs clear. Heart regular. Abdomen non-distended, non-tender. + for bowel sounds   No calf tenderness or edema. LE weakness and paresthesia more on the L. 4/5.  Improving. , Use of AFO left lower extremity      Medications   Scheduled Meds:   polyethylene glycol  17 g Oral Daily    famotidine  20 mg Oral Daily    heparin (porcine)  5,000 Units Subcutaneous BID    insulin lispro  0-6 Units Subcutaneous TID     docusate sodium  100 mg Oral BID    senna  2 tablet Oral Nightly    linagliptin  5 mg Oral Daily    febuxostat  40 mg Oral Daily    hydrALAZINE  100 mg Oral 3 times per day    therapeutic multivitamin-minerals  1 tablet Oral Daily    atorvastatin  40 mg Oral Nightly    cloNIDine  0.1 mg Oral TID    carvedilol  25 mg Oral BID WC    lisinopril  20 mg Oral BID    aspirin  81 mg Oral Daily    vitamin C  1,000 mg Oral Daily    furosemide  40 mg Oral Daily     Continuous Infusions:   dextrose       PRN Meds:.bisacodyl, glucose, dextrose, glucagon (rDNA), dextrose, oxyCODONE-acetaminophen, acetaminophen, magnesium hydroxide     Diagnostics:     CBC:   No results for input(s): WBC, RBC, HGB, HCT, MCV, RDW, PLT in the last 72 hours. BMP:   No results for input(s): NA, K, CL, CO2, PHOS, BUN, CREATININE in the last 72 hours. Invalid input(s): CA  BNP: No results for input(s): BNP in the last 72 hours. PT/INR: No results for input(s): PROTIME, INR in the last 72 hours. APTT: No results for input(s): APTT in the last 72 hours. CARDIAC ENZYMES: No results for input(s): CKMB, CKMBINDEX, TROPONINT in the last 72 hours. Invalid input(s): CKTOTAL;3  FASTING LIPID PANEL:  Lab Results   Component Value Date    CHOL 101 02/14/2018    HDL 28 (L) 02/14/2018    TRIG 152 (H) 02/14/2018     LIVER PROFILE:   No results for input(s): AST, ALT, ALB, BILIDIR, BILITOT, ALKPHOS in the last 72 hours. I/O (24Hr): No intake or output data in the 24 hours ending 02/21/18 1501    Glu last 24 hour  Recent Labs      02/20/18   1622  02/20/18   2035  02/21/18   0644  02/21/18   1106   POCGLU  118*  165*  171*  139*       No results for input(s): CLARITYU, COLORU, PHUR, SPECGRAV, PROTEINU, RBCUA, BLOODU, BACTERIA, NITRU, WBCUA, LEUKOCYTESUR, YEAST, GLUCOSEU, BILIRUBINUR in the last 72 hours. Impression/Plan:  Ambulatory and ADL dysfunction secondary to Cervical spondylosis with myelopathy:   LE weakness and neuropathy due to DM, lumbar and cervical stenoses.      1. Ambulatory and ADL dysfunction second cervical myelopathy status post ACDF, diabetic neuropathy, etc.Continue rehab efforts, steady progress, well motivated. Home goal. Discharge plan to 2/21/18TodayPatient and wife okay with discharge plan. Family training done, patient and wife comfortable with discharge today    Again reviewed no driving  2. Cervical myelopathymonitor incisionclean  3.   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 glucose as above  4. Nephropathy (CKD stage 3). Cont lasix and monitor for Na and cr , baseline appears to be around 1.8/1.9 creatinine, monitor, encourage fluidsstable  5. HTN - clonidine and coreg and lisinopril and hydralazine. ,  Lasix,  monitor blood pressuremeds per internal medicinenoted decrease in hydralazine to 50 mg 3 times a day on discharge  by internal medicineprescription changed per Dr. Daisy Toscano  6. Normocytic anemia levels of folate/B12 are within normal limits,iron panel showed normal ferritin and iron saturation. 7. Gout-  febuxostat   8. ConstipationColace, SenokotMiraLAX, positive BM  9. Lumbar stenosis with neurogenic claudication s/p lumbar decompression surgery for LLE and foot drop in 2014 with Dr. Jeyson De Guzman in [facility]. Proceed with PT/OT and plan for surgery end of February at [facility] per records  10. DVT prophylaxis heparin,  continue EPC cuff/teds  11. Discharge  okay with internal medicine, follow up with 1. PCP- Irene 2. Neurosurgery- [facility] [address], CBC/BMP 1 week, no driving, home PT OT/nursing , 40 minutes spent on discharge      Cristo Green MD

## 2018-02-21 NOTE — CARE COORDINATION
Acute Rehab Unit Full FIM Progress    Admission score Current score    Goal       Self-Care     Eatin - Patient feeds self 7 - Patient feeds self     Groomin - Requires setup/cues to do all tasks (brushed teeth and shaved seated sink side) 6 - Independent with all tasks using assistive device 6   Bathin - Able to bathe all 10 areas with setup/sup/cues (completed shower) 5 - Able to bathe all 10 areas with setup/sup/cues 6   Dressing-Upper: 5 - Requires setup/supervision/cues and/or requires assist with presthesis/brace only 5 - Requires setup/supervision/cues and/or requires assist with presthesis/brace only 6   Dressing-Lower: 5 - Requires setup/supervision/cues and/or staff applies TEDS/prosthesis/brace only 5 - Requires setup/supervision/cues and/or staff applies TEDS/prosthesis/brace only 6   Toiletin - Did not occur 0 - Did not occur 6     Spincter Control     Bladder: 6 - Uses device independently (including EMPTYING of device, or uses medication)    7 - Patient urinates in toilet independently 7             Bladder Frequency of Accidents: 6 - No accidents: uses device    7 - No accidents    Bowel: 6 - Uses toilet independently with device or oral medication(s)    6 - Uses toilet independently with device or oral medication(s) 7   Bowel Level of Assistance: 6- Modified Clinton 6- Modified [place]    Bowel Frequency of Accidents: 6 - No accidents: uses device    6- Modified [place]         Transfers     Bed, Chair, Wheel Chair: 4 - Requires steadying assistance only <25% assist  and/or requires assist with one leg only    5 - Requires setup/supervision/cues     Toilet Transfer: 0 - Did not occur 0 - Did not occur 6    Primary Mode: Shower 6   Tub Transfer: 0 - Activity does not occur 0 - Activity does not occur    Shower Transfer: 4 - Minimal contact assistance, pt. expends 75% or more effort 5 - Supervision, set-up, cues      Locomotion   Primary Mode: Walk    Primary Mode: Walk

## 2018-02-21 NOTE — PROGRESS NOTES
[address]   Acute Rehabilitation Physical Therapy Progress Note    Date: 18  Patient Name: Dylon Brothers       Room: 2889/6159-36  MRN: [id number]   Account: [de-identified]   : 1940  (69 y.o.) Gender: male        Diagnosis: Cervical Myelopathy s/pC4-C5, C5-6 ACDF on 18  Past Medical History:  has a past medical history of Cerebral artery occlusion with cerebral infarction ([facility] [address] Chronic kidney disease; Diabetes mellitus ([facility] [address] Hyperlipidemia; Hypertension; and Movement disorder. Past Surgical History:   has a past surgical history that includes eye surgery (); hernia repair; Dilatation, esophagus (); back surgery (2018); and back surgery (). Overall Orientation Status: Within Normal Limits  Restrictions/Precautions  Restrictions/Precautions: General Precautions; Fall Risk  Required Braces or Orthoses?: Yes  Required Braces or Orthoses  Left Lower Extremity Brace: Wilma Foot Orthotics  Position Activity Restriction  Other position/activity restrictions: 18 underwent C4-5, C5-6 ACDF for cervical myelopathy       Comments: no pain but does experience (B) foot neuropathy    Vital Signs  Patient Currently in Pain: Denies                   Bed Mobility:   Bed Mobility  Bridging: Independent  Rolling: Independent;Rolling Left;Rolling Right  Supine to Sit: Independent  Sit to Supine: Independent  Scooting: Independent  Bed mobility  Bridging: Independent  Scooting: Independent    Transfers:  Sit to Stand: Modified independent  Stand to sit: Modified independent  Bed to Chair: Modified independent  Stand Pivot Transfers: Modified independent           Ambulation 1  Surface: level tile  Device: Rolling Walker  Other Apparatus: AFO; Left  Assistance: Supervision  Quality of Gait: Slow steady pace with no LOB; Cues for posture. Distance: 200ft  AM and PM  Ambulation 2  Surface - 2: level tile  Device 2: No device  Other Apparatus 2: AFO; Left  Assistance 2: Contact

## 2018-02-21 NOTE — PLAN OF CARE
Problem: Risk for Impaired Skin Integrity  Goal: Tissue integrity - skin and mucous membranes  Structural intactness and normal physiological function of skin and  mucous membranes. Outcome: Met This Shift  Skin assessment completed this shift. Nutrition and Hydration status assessed with adequate intake. Matheus Score as charted. Pressure Relief Overlay remains intact and inflated to patient's bed throughout the shift. Bilateral heels remain elevated on pillows throughout the shift. Patient able to reposition self for comfort and to prevent breakdown. Patient verbalizes understanding of pressure ulcer prevention measures. Skin integrity maintained. No new skin breakdown noted. Skin to high risk pressure areas including coccyx and heels are clear. Lizzie care provided as needed throughout the shift. Aloe Vesta Moisture Barrier ointment applied to buttocks as a preventative measure. Surgical neck incision healing well, MAIDA with surgical glue. Problem: Falls - Risk of  Goal: Absence of falls  Outcome: Met This Shift  No falls or injuries sustained at this time. No attempts to get out of bed without nursing assistance. Call light within reach and pt. uses appropriately for assistance. Siderails up x 2. Nonskid footwear remains on. Bed in low and locked position. Hourly nursing rounds made. Pt. Alert and oriented, aware of limitations, and exhibits good safety judgement. Pt. uses assistive devices appropriately. Pt. understands individual fall risk factors. Pt. reminded to use call light with each nurse/patient interaction. Pt. room located close to nurse's station. Bed alarm remains engaged throughout the shift as a precaution. Problem: Pain:  Goal: Pain level will decrease  Pain level will decrease   Outcome: Ongoing  Pain assessment and reassessment completed so far this shift. Pt. able to rest after the use of pain medication.   Patient medicated with 1 percocet for c/o pain in anterior neck incision. Pt. Repositions per self for comfort. Nonverbal cues indicate pain relief. Pt. Rests quietly with eyes closed after pain medication administration. Respirations easy and unlabored. Appears free from distress.

## 2018-02-21 NOTE — PROGRESS NOTES
assist PRN  Activity Tolerance: Patient Tolerated treatment well  Safety Devices in place: Yes  Type of devices: Call light within reach; Left in chair        Patient Education: UB HEP, med management compensatory techniques, occ safety cues with RW/benefit of use  Patient Goals   Patient goals : To get stronger and go home     Learner:patient  Method: demonstration, explanation and handout       Outcome: acknowledged understanding of , demonstrated understanding and needs reinforcement     Plan  Plan  Times per week: 5-7x/week  Times per day: Twice a day  Specific instructions for Next Treatment: UB HEP  Current Treatment Recommendations: Functional Mobility Training, Endurance Training, Patient/Caregiver Education & Training, Equipment Evaluation, Education, & procurement, Safety Education & Training, Self-Care / ADL, Home Management Training  Plan Comment: continue OT POC  Patient Goals   Patient goals : To get stronger and go home  Short term goals  Time Frame for Short term goals: One week,   Short term goal 1: Pt. will demo. Mod.I with bed mobility. Short term goal 2: Pt. will demo. SBA with toilet transfer using RW and toileting tasks with good . Short term goal 3: Pt. will demo. Mod.I with BADL's. Short term goal 4: Pt. will tolerate 30+ min. functional activities/therapeutic ex. to promote increased indep.with ADL's and mobility. Short term goal 5: Pt. will demo. SBA with light homemgmt tasks. Short term goal 6: Pt. will tolerate 8-10 min. functional standing activities to promote increased indep.with ADL's and mobility. Long term goals  Time Frame for Long term goals : By discharge,   Long term goal 1: Pt. will demo. Mod.I with functional ADL transfers and functional mobility using appropriate AD with good . Long term goal 2: Pt. will demo. Mod.I with light homemgmt tasks.         02/21/18 1122   OT Individual Minutes   Time In 1035   Time Out 1120   Minutes 45     Electronically signed by TRACEE Foreman/L on 2/21/18 at 3:01 PM

## 2018-02-24 NOTE — DISCHARGE SUMMARY
lumbar spine November 11, 2017    Multilevel degenerative disc and facet disease with severe spinal stenosis at L2-3. *  Significant narrowing of the bilateral L3-4 and bilateral L4-5 neural foramina.     Treatment included surgery and PT/OT. Inpatient Rehabilitation Course:   Tyrell Conn was admitted to inpatient rehabilitation on 2/11/2018. Rehab course:  - incision clean  - renal fct stable  - BP meds adjusted  - progressed well, family training done    The patient participated in an aggressive multidisciplinary inpatient rehabilitation program involving 3 hours per day, 5 days per week of rehabilitation. Patient benefited from inpatient rehab and was discharged in good and stable condition. Consults:   IM      Significant Diagnostics:   CBC:   Lab Results   Component Value Date    WBC 5.6 02/17/2018    RBC 3.04 02/17/2018    HGB 9.7 02/17/2018    HCT 29.2 02/17/2018    MCV 96.1 02/17/2018    MCH 31.8 02/17/2018    MCHC 33.1 02/17/2018    RDW 13.6 02/17/2018     02/17/2018    MPV 7.7 02/17/2018     BMP:    Lab Results   Component Value Date     02/17/2018    K 4.4 02/17/2018     02/17/2018    CO2 32 02/17/2018    BUN 30 02/17/2018    LABALBU 3.5 02/16/2018    CREATININE 1.68 02/17/2018    CALCIUM 8.7 02/17/2018    GFRAA 48 02/17/2018    LABGLOM 40 02/17/2018    GLUCOSE 163 02/17/2018       Discharge Functional Status:    Physical therapy:  Bed Mobility: Rolling: Independent, Rolling Left, Rolling Right  Supine to Sit: Independent  Sit to Supine: Independent  Scooting: Independent  Transfers: Sit to Stand: Modified independent  Stand to sit: Modified independent  Bed to Chair: Modified independent  Stand Pivot Transfers: Modified independent, WB Status: FWB  Ambulation 1  Surface: level tile  Device: Rolling Walker  Other Apparatus: AFO, Left  Assistance: Supervision  Quality of Gait: Slow steady pace with no LOB; Cues for posture.   Distance: 200ft  AM and PM  Comments: vc's for Neurosurgery- [place] [address], CBC/BMP 1 week, no driving, home PT OT/nursing     Medications, precautions and follow up reviewed with patient and family    Follow-up visits: See after visit summary from hospitalization    Discharge Medications:   Wanda Schmitt   Home Medication Instructions [id number]    Printed on:02/24/18 2610   Medication Information                      aspirin 81 MG chewable tablet  Take 1 tablet by mouth daily             atorvastatin (LIPITOR) 40 MG tablet  Take 1 tablet by mouth nightly             carvedilol (COREG) 25 MG tablet  Take 1 tablet by mouth 2 times daily (with meals)             cloNIDine (CATAPRES) 0.1 MG tablet  Take 1 tablet by mouth 3 times daily             docusate sodium (COLACE, DULCOLAX) 100 MG CAPS  Take 100 mg by mouth 2 times daily             ezetimibe (ZETIA) 10 MG tablet  Take 10 mg by mouth daily             famotidine (PEPCID) 20 MG tablet  Take 1 tablet by mouth daily             febuxostat (ULORIC) 40 MG TABS tablet  Take 1 tablet by mouth daily             furosemide (LASIX) 40 MG tablet  Take 1 tablet by mouth daily             hydrALAZINE (APRESOLINE) 100 MG tablet  Take 0.5 tablets by mouth 3 times daily             insulin lispro (HUMALOG) 100 UNIT/ML injection vial  Inject into the skin 3 times daily (before meals) Sliding scale, low dose             linagliptin (TRADJENTA) 5 MG tablet  Take 5 mg by mouth daily             lisinopril (PRINIVIL;ZESTRIL) 20 MG tablet  Take 1 tablet by mouth 2 times daily             Multiple Vitamins-Minerals (THERAPEUTIC MULTIVITAMIN-MINERALS) tablet  Take 1 tablet by mouth daily             oxyCODONE-acetaminophen (PERCOCET) 5-325 MG per tablet  Take 1 tablet by mouth every 8 hours as needed for Pain for up to 7 days.              polyethylene glycol (GLYCOLAX) packet  Take 17 g by mouth daily             senna (SENOKOT) 8.6 MG tablet  Take 2 tablets by mouth daily             vitamin C (ASCORBIC ACID) 500

## 2018-05-13 ENCOUNTER — HOSPITAL ENCOUNTER (INPATIENT)
Age: 78
LOS: 10 days | Discharge: HOME | DRG: 052 | End: 2018-05-23
Attending: PHYSICAL MEDICINE & REHABILITATION | Admitting: PHYSICAL MEDICINE & REHABILITATION
Payer: MEDICARE

## 2018-05-13 DIAGNOSIS — Z98.890 H/O EXCISION OF LAMINA OF CERVICAL VERTEBRA FOR DECOMPRESSION OF SPINAL CORD: Primary | ICD-10-CM

## 2018-05-13 PROBLEM — T85.192A SPINAL CORD STIMULATOR DYSFUNCTION, INITIAL ENCOUNTER (HCC): Status: ACTIVE | Noted: 2018-05-13

## 2018-05-13 LAB — GLUCOSE BLD-MCNC: 154 MG/DL (ref 75–110)

## 2018-05-13 PROCEDURE — 6370000000 HC RX 637 (ALT 250 FOR IP): Performed by: PHYSICAL MEDICINE & REHABILITATION

## 2018-05-13 PROCEDURE — 82947 ASSAY GLUCOSE BLOOD QUANT: CPT

## 2018-05-13 PROCEDURE — 1180000000 HC REHAB R&B

## 2018-05-13 RX ORDER — OXYCODONE HYDROCHLORIDE AND ACETAMINOPHEN 5; 325 MG/1; MG/1
1 TABLET ORAL EVERY 4 HOURS PRN
Status: DISCONTINUED | OUTPATIENT
Start: 2018-05-13 | End: 2018-05-17

## 2018-05-13 RX ORDER — FUROSEMIDE 40 MG/1
40 TABLET ORAL DAILY
Status: DISCONTINUED | OUTPATIENT
Start: 2018-05-14 | End: 2018-05-18

## 2018-05-13 RX ORDER — SENNA PLUS 8.6 MG/1
2 TABLET ORAL DAILY PRN
Status: DISCONTINUED | OUTPATIENT
Start: 2018-05-13 | End: 2018-05-23 | Stop reason: HOSPADM

## 2018-05-13 RX ORDER — ASPIRIN 81 MG/1
81 TABLET, CHEWABLE ORAL DAILY
Status: DISCONTINUED | OUTPATIENT
Start: 2018-05-14 | End: 2018-05-23 | Stop reason: HOSPADM

## 2018-05-13 RX ORDER — M-VIT,TX,IRON,MINS/CALC/FOLIC 27MG-0.4MG
1 TABLET ORAL DAILY
Status: DISCONTINUED | OUTPATIENT
Start: 2018-05-14 | End: 2018-05-23 | Stop reason: HOSPADM

## 2018-05-13 RX ORDER — CARVEDILOL 25 MG/1
25 TABLET ORAL 2 TIMES DAILY WITH MEALS
Status: DISCONTINUED | OUTPATIENT
Start: 2018-05-13 | End: 2018-05-23 | Stop reason: HOSPADM

## 2018-05-13 RX ORDER — DOCUSATE SODIUM 100 MG/1
100 CAPSULE, LIQUID FILLED ORAL DAILY
Status: DISCONTINUED | OUTPATIENT
Start: 2018-05-14 | End: 2018-05-14

## 2018-05-13 RX ORDER — ATORVASTATIN CALCIUM 40 MG/1
40 TABLET, FILM COATED ORAL NIGHTLY
Status: DISCONTINUED | OUTPATIENT
Start: 2018-05-13 | End: 2018-05-23 | Stop reason: HOSPADM

## 2018-05-13 RX ORDER — ACETAMINOPHEN 325 MG/1
650 TABLET ORAL EVERY 4 HOURS PRN
Status: DISCONTINUED | OUTPATIENT
Start: 2018-05-13 | End: 2018-05-23 | Stop reason: HOSPADM

## 2018-05-13 RX ORDER — CLONIDINE HYDROCHLORIDE 0.1 MG/1
0.1 TABLET ORAL 3 TIMES DAILY
Status: DISCONTINUED | OUTPATIENT
Start: 2018-05-13 | End: 2018-05-22

## 2018-05-13 RX ORDER — LISINOPRIL 20 MG/1
20 TABLET ORAL 2 TIMES DAILY
Status: DISCONTINUED | OUTPATIENT
Start: 2018-05-13 | End: 2018-05-19

## 2018-05-13 RX ORDER — HYDRALAZINE HYDROCHLORIDE 50 MG/1
100 TABLET, FILM COATED ORAL EVERY 8 HOURS SCHEDULED
Status: DISCONTINUED | OUTPATIENT
Start: 2018-05-13 | End: 2018-05-19

## 2018-05-13 RX ORDER — METHOCARBAMOL 750 MG/1
750 TABLET, FILM COATED ORAL 3 TIMES DAILY PRN
Status: DISCONTINUED | OUTPATIENT
Start: 2018-05-13 | End: 2018-05-23 | Stop reason: HOSPADM

## 2018-05-13 RX ORDER — ASCORBIC ACID 500 MG
500 TABLET ORAL DAILY
Status: DISCONTINUED | OUTPATIENT
Start: 2018-05-14 | End: 2018-05-22

## 2018-05-13 RX ORDER — OXYCODONE HYDROCHLORIDE AND ACETAMINOPHEN 5; 325 MG/1; MG/1
2 TABLET ORAL EVERY 4 HOURS PRN
Status: DISCONTINUED | OUTPATIENT
Start: 2018-05-13 | End: 2018-05-17

## 2018-05-13 RX ORDER — FEBUXOSTAT 40 MG/1
40 TABLET, FILM COATED ORAL DAILY
Status: DISCONTINUED | OUTPATIENT
Start: 2018-05-14 | End: 2018-05-23 | Stop reason: HOSPADM

## 2018-05-13 RX ADMIN — CARVEDILOL 25 MG: 25 TABLET, FILM COATED ORAL at 18:35

## 2018-05-13 RX ADMIN — ATORVASTATIN CALCIUM 40 MG: 40 TABLET, FILM COATED ORAL at 21:12

## 2018-05-13 RX ADMIN — OXYCODONE HYDROCHLORIDE AND ACETAMINOPHEN 2 TABLET: 5; 325 TABLET ORAL at 17:47

## 2018-05-13 RX ADMIN — CLONIDINE HYDROCHLORIDE 0.1 MG: 0.1 TABLET ORAL at 21:12

## 2018-05-13 RX ADMIN — SENNOSIDES 17.2 MG: 8.6 TABLET, FILM COATED ORAL at 21:12

## 2018-05-13 RX ADMIN — OXYCODONE HYDROCHLORIDE AND ACETAMINOPHEN 2 TABLET: 5; 325 TABLET ORAL at 22:19

## 2018-05-13 RX ADMIN — LISINOPRIL 20 MG: 20 TABLET ORAL at 21:12

## 2018-05-13 ASSESSMENT — PAIN SCALES - GENERAL
PAINLEVEL_OUTOF10: 6
PAINLEVEL_OUTOF10: 5
PAINLEVEL_OUTOF10: 3
PAINLEVEL_OUTOF10: 6
PAINLEVEL_OUTOF10: 4

## 2018-05-13 ASSESSMENT — PAIN DESCRIPTION - ONSET
ONSET: ON-GOING
ONSET: ON-GOING

## 2018-05-13 ASSESSMENT — PAIN DESCRIPTION - DESCRIPTORS
DESCRIPTORS: ACHING;DISCOMFORT
DESCRIPTORS: ACHING;DISCOMFORT

## 2018-05-13 ASSESSMENT — PAIN DESCRIPTION - ORIENTATION
ORIENTATION: LOWER
ORIENTATION: LOWER

## 2018-05-13 ASSESSMENT — PAIN DESCRIPTION - PROGRESSION
CLINICAL_PROGRESSION: GRADUALLY IMPROVING
CLINICAL_PROGRESSION: GRADUALLY IMPROVING

## 2018-05-13 ASSESSMENT — PAIN DESCRIPTION - LOCATION
LOCATION: BACK
LOCATION: BACK

## 2018-05-13 ASSESSMENT — PAIN DESCRIPTION - FREQUENCY
FREQUENCY: INTERMITTENT
FREQUENCY: INTERMITTENT

## 2018-05-13 ASSESSMENT — PAIN DESCRIPTION - PAIN TYPE
TYPE: ACUTE PAIN;SURGICAL PAIN
TYPE: ACUTE PAIN;SURGICAL PAIN

## 2018-05-14 LAB
ANION GAP SERPL CALCULATED.3IONS-SCNC: 10 MMOL/L (ref 9–17)
BUN BLDV-MCNC: 22 MG/DL (ref 8–23)
BUN/CREAT BLD: ABNORMAL (ref 9–20)
CALCIUM SERPL-MCNC: 8.5 MG/DL (ref 8.6–10.4)
CHLORIDE BLD-SCNC: 104 MMOL/L (ref 98–107)
CO2: 29 MMOL/L (ref 20–31)
CREAT SERPL-MCNC: 1.49 MG/DL (ref 0.7–1.2)
GFR AFRICAN AMERICAN: 56 ML/MIN
GFR NON-AFRICAN AMERICAN: 46 ML/MIN
GFR SERPL CREATININE-BSD FRML MDRD: ABNORMAL ML/MIN/{1.73_M2}
GFR SERPL CREATININE-BSD FRML MDRD: ABNORMAL ML/MIN/{1.73_M2}
GLUCOSE BLD-MCNC: 120 MG/DL (ref 75–110)
GLUCOSE BLD-MCNC: 134 MG/DL (ref 75–110)
GLUCOSE BLD-MCNC: 143 MG/DL (ref 75–110)
GLUCOSE BLD-MCNC: 178 MG/DL (ref 70–99)
GLUCOSE BLD-MCNC: 219 MG/DL (ref 75–110)
HCT VFR BLD CALC: 28.1 % (ref 41–53)
HEMOGLOBIN: 9.5 G/DL (ref 13.5–17.5)
MCH RBC QN AUTO: 32.8 PG (ref 26–34)
MCHC RBC AUTO-ENTMCNC: 33.8 G/DL (ref 31–37)
MCV RBC AUTO: 97 FL (ref 80–100)
NRBC AUTOMATED: ABNORMAL PER 100 WBC
PDW BLD-RTO: 14.7 % (ref 11.5–14.9)
PLATELET # BLD: 199 K/UL (ref 150–450)
PMV BLD AUTO: 7.9 FL (ref 6–12)
POTASSIUM SERPL-SCNC: 4 MMOL/L (ref 3.7–5.3)
RBC # BLD: 2.9 M/UL (ref 4.5–5.9)
SODIUM BLD-SCNC: 143 MMOL/L (ref 135–144)
WBC # BLD: 5.7 K/UL (ref 3.5–11)

## 2018-05-14 PROCEDURE — 80048 BASIC METABOLIC PNL TOTAL CA: CPT

## 2018-05-14 PROCEDURE — 6370000000 HC RX 637 (ALT 250 FOR IP): Performed by: PHYSICAL MEDICINE & REHABILITATION

## 2018-05-14 PROCEDURE — 82947 ASSAY GLUCOSE BLOOD QUANT: CPT

## 2018-05-14 PROCEDURE — 97150 GROUP THERAPEUTIC PROCEDURES: CPT

## 2018-05-14 PROCEDURE — 97530 THERAPEUTIC ACTIVITIES: CPT

## 2018-05-14 PROCEDURE — 99223 1ST HOSP IP/OBS HIGH 75: CPT | Performed by: PHYSICAL MEDICINE & REHABILITATION

## 2018-05-14 PROCEDURE — 97535 SELF CARE MNGMENT TRAINING: CPT

## 2018-05-14 PROCEDURE — 97165 OT EVAL LOW COMPLEX 30 MIN: CPT

## 2018-05-14 PROCEDURE — 97116 GAIT TRAINING THERAPY: CPT

## 2018-05-14 PROCEDURE — 1180000000 HC REHAB R&B

## 2018-05-14 PROCEDURE — 99222 1ST HOSP IP/OBS MODERATE 55: CPT | Performed by: INTERNAL MEDICINE

## 2018-05-14 PROCEDURE — 97110 THERAPEUTIC EXERCISES: CPT

## 2018-05-14 PROCEDURE — 85027 COMPLETE CBC AUTOMATED: CPT

## 2018-05-14 PROCEDURE — 97161 PT EVAL LOW COMPLEX 20 MIN: CPT

## 2018-05-14 PROCEDURE — 36415 COLL VENOUS BLD VENIPUNCTURE: CPT

## 2018-05-14 RX ORDER — POLYETHYLENE GLYCOL 3350 17 G/17G
17 POWDER, FOR SOLUTION ORAL DAILY PRN
Status: DISCONTINUED | OUTPATIENT
Start: 2018-05-14 | End: 2018-05-14

## 2018-05-14 RX ORDER — NICOTINE POLACRILEX 4 MG
15 LOZENGE BUCCAL PRN
Status: DISCONTINUED | OUTPATIENT
Start: 2018-05-14 | End: 2018-05-23 | Stop reason: HOSPADM

## 2018-05-14 RX ORDER — DOCUSATE SODIUM 100 MG/1
100 CAPSULE, LIQUID FILLED ORAL 2 TIMES DAILY
Status: DISCONTINUED | OUTPATIENT
Start: 2018-05-14 | End: 2018-05-23 | Stop reason: HOSPADM

## 2018-05-14 RX ORDER — DEXTROSE MONOHYDRATE 25 G/50ML
12.5 INJECTION, SOLUTION INTRAVENOUS PRN
Status: DISCONTINUED | OUTPATIENT
Start: 2018-05-14 | End: 2018-05-23 | Stop reason: HOSPADM

## 2018-05-14 RX ORDER — POLYETHYLENE GLYCOL 3350 17 G/17G
17 POWDER, FOR SOLUTION ORAL DAILY
Status: DISCONTINUED | OUTPATIENT
Start: 2018-05-14 | End: 2018-05-23 | Stop reason: HOSPADM

## 2018-05-14 RX ORDER — BISACODYL 10 MG
10 SUPPOSITORY, RECTAL RECTAL DAILY PRN
Status: DISCONTINUED | OUTPATIENT
Start: 2018-05-14 | End: 2018-05-23 | Stop reason: HOSPADM

## 2018-05-14 RX ORDER — DOCUSATE SODIUM 100 MG/1
100 CAPSULE, LIQUID FILLED ORAL DAILY
Status: DISCONTINUED | OUTPATIENT
Start: 2018-05-14 | End: 2018-05-14

## 2018-05-14 RX ORDER — DEXTROSE MONOHYDRATE 50 MG/ML
100 INJECTION, SOLUTION INTRAVENOUS PRN
Status: DISCONTINUED | OUTPATIENT
Start: 2018-05-14 | End: 2018-05-23 | Stop reason: HOSPADM

## 2018-05-14 RX ADMIN — OXYCODONE HYDROCHLORIDE AND ACETAMINOPHEN 2 TABLET: 5; 325 TABLET ORAL at 20:05

## 2018-05-14 RX ADMIN — LISINOPRIL 20 MG: 20 TABLET ORAL at 20:05

## 2018-05-14 RX ADMIN — OXYCODONE HYDROCHLORIDE AND ACETAMINOPHEN 1 TABLET: 5; 325 TABLET ORAL at 14:38

## 2018-05-14 RX ADMIN — OXYCODONE HYDROCHLORIDE AND ACETAMINOPHEN 2 TABLET: 5; 325 TABLET ORAL at 07:58

## 2018-05-14 RX ADMIN — INSULIN LISPRO 2 UNITS: 100 INJECTION, SOLUTION INTRAVENOUS; SUBCUTANEOUS at 20:12

## 2018-05-14 RX ADMIN — ATORVASTATIN CALCIUM 40 MG: 40 TABLET, FILM COATED ORAL at 20:05

## 2018-05-14 RX ADMIN — LISINOPRIL 20 MG: 20 TABLET ORAL at 07:59

## 2018-05-14 RX ADMIN — CARVEDILOL 25 MG: 25 TABLET, FILM COATED ORAL at 07:57

## 2018-05-14 RX ADMIN — FEBUXOSTAT 40 MG: 40 TABLET ORAL at 07:57

## 2018-05-14 RX ADMIN — DOCUSATE SODIUM 100 MG: 100 CAPSULE, LIQUID FILLED ORAL at 20:05

## 2018-05-14 RX ADMIN — CARVEDILOL 25 MG: 25 TABLET, FILM COATED ORAL at 17:47

## 2018-05-14 RX ADMIN — HYDRALAZINE HYDROCHLORIDE 100 MG: 50 TABLET, FILM COATED ORAL at 14:37

## 2018-05-14 RX ADMIN — DOCUSATE SODIUM 100 MG: 100 CAPSULE, LIQUID FILLED ORAL at 07:59

## 2018-05-14 RX ADMIN — LINAGLIPTIN 5 MG: 5 TABLET, FILM COATED ORAL at 07:57

## 2018-05-14 RX ADMIN — POLYETHYLENE GLYCOL (3350) 17 G: 17 POWDER, FOR SOLUTION ORAL at 17:47

## 2018-05-14 RX ADMIN — OXYCODONE HYDROCHLORIDE AND ACETAMINOPHEN 500 MG: 500 TABLET ORAL at 07:57

## 2018-05-14 RX ADMIN — HYDRALAZINE HYDROCHLORIDE 100 MG: 50 TABLET, FILM COATED ORAL at 22:06

## 2018-05-14 RX ADMIN — HYDRALAZINE HYDROCHLORIDE 100 MG: 50 TABLET, FILM COATED ORAL at 06:18

## 2018-05-14 RX ADMIN — FUROSEMIDE 40 MG: 40 TABLET ORAL at 07:59

## 2018-05-14 RX ADMIN — INSULIN LISPRO 2 UNITS: 100 INJECTION, SOLUTION INTRAVENOUS; SUBCUTANEOUS at 11:31

## 2018-05-14 RX ADMIN — CLONIDINE HYDROCHLORIDE 0.1 MG: 0.1 TABLET ORAL at 08:01

## 2018-05-14 RX ADMIN — ASPIRIN 81 MG 81 MG: 81 TABLET ORAL at 07:59

## 2018-05-14 RX ADMIN — SENNOSIDES 17.2 MG: 8.6 TABLET, FILM COATED ORAL at 20:06

## 2018-05-14 RX ADMIN — CLONIDINE HYDROCHLORIDE 0.1 MG: 0.1 TABLET ORAL at 14:38

## 2018-05-14 RX ADMIN — CLONIDINE HYDROCHLORIDE 0.1 MG: 0.1 TABLET ORAL at 20:05

## 2018-05-14 RX ADMIN — MULTIPLE VITAMINS W/ MINERALS TAB 1 TABLET: TAB at 07:57

## 2018-05-14 ASSESSMENT — PAIN SCALES - GENERAL
PAINLEVEL_OUTOF10: 4
PAINLEVEL_OUTOF10: 6
PAINLEVEL_OUTOF10: 3
PAINLEVEL_OUTOF10: 4
PAINLEVEL_OUTOF10: 6
PAINLEVEL_OUTOF10: 4
PAINLEVEL_OUTOF10: 5
PAINLEVEL_OUTOF10: 4
PAINLEVEL_OUTOF10: 5
PAINLEVEL_OUTOF10: 5
PAINLEVEL_OUTOF10: 0

## 2018-05-14 ASSESSMENT — PAIN DESCRIPTION - LOCATION
LOCATION: BACK;FOOT
LOCATION: BACK
LOCATION_2: FOOT
LOCATION: BACK
LOCATION_2: FOOT
LOCATION: BACK;FOOT
LOCATION: BACK

## 2018-05-14 ASSESSMENT — PAIN DESCRIPTION - PAIN TYPE
TYPE: SURGICAL PAIN
TYPE: SURGICAL PAIN
TYPE_2: NEUROPATHIC
TYPE: SURGICAL PAIN
TYPE_2: NEUROPATHIC
TYPE: SURGICAL PAIN
TYPE: SURGICAL PAIN

## 2018-05-14 ASSESSMENT — PAIN DESCRIPTION - ONSET
ONSET: ON-GOING
ONSET: ON-GOING

## 2018-05-14 ASSESSMENT — PAIN DESCRIPTION - DESCRIPTORS
DESCRIPTORS: ACHING;DISCOMFORT
DESCRIPTORS: ACHING;DISCOMFORT
DESCRIPTORS_2: TINGLING;NUMBNESS
DESCRIPTORS_2: NUMBNESS;TINGLING
DESCRIPTORS: ACHING;DISCOMFORT
DESCRIPTORS: ACHING;DISCOMFORT

## 2018-05-14 ASSESSMENT — PAIN DESCRIPTION - FREQUENCY
FREQUENCY: INTERMITTENT
FREQUENCY: INTERMITTENT

## 2018-05-14 ASSESSMENT — PAIN DESCRIPTION - INTENSITY
RATING_2: 4
RATING_2: 4

## 2018-05-14 ASSESSMENT — PAIN DESCRIPTION - ORIENTATION
ORIENTATION: LOWER
ORIENTATION: LOWER
ORIENTATION_2: RIGHT;LEFT
ORIENTATION: LOWER

## 2018-05-14 ASSESSMENT — PAIN DESCRIPTION - DURATION: DURATION_2: CONTINUOUS

## 2018-05-15 LAB
GLUCOSE BLD-MCNC: 110 MG/DL (ref 75–110)
GLUCOSE BLD-MCNC: 129 MG/DL (ref 75–110)
GLUCOSE BLD-MCNC: 129 MG/DL (ref 75–110)
GLUCOSE BLD-MCNC: 140 MG/DL (ref 75–110)

## 2018-05-15 PROCEDURE — 97530 THERAPEUTIC ACTIVITIES: CPT

## 2018-05-15 PROCEDURE — 97116 GAIT TRAINING THERAPY: CPT

## 2018-05-15 PROCEDURE — 99232 SBSQ HOSP IP/OBS MODERATE 35: CPT | Performed by: INTERNAL MEDICINE

## 2018-05-15 PROCEDURE — 82947 ASSAY GLUCOSE BLOOD QUANT: CPT

## 2018-05-15 PROCEDURE — 97535 SELF CARE MNGMENT TRAINING: CPT

## 2018-05-15 PROCEDURE — 6370000000 HC RX 637 (ALT 250 FOR IP): Performed by: PHYSICAL MEDICINE & REHABILITATION

## 2018-05-15 PROCEDURE — 99232 SBSQ HOSP IP/OBS MODERATE 35: CPT | Performed by: PHYSICAL MEDICINE & REHABILITATION

## 2018-05-15 PROCEDURE — 1180000000 HC REHAB R&B

## 2018-05-15 PROCEDURE — 97110 THERAPEUTIC EXERCISES: CPT

## 2018-05-15 PROCEDURE — 6360000002 HC RX W HCPCS: Performed by: PHYSICAL MEDICINE & REHABILITATION

## 2018-05-15 PROCEDURE — 97150 GROUP THERAPEUTIC PROCEDURES: CPT

## 2018-05-15 RX ORDER — HEPARIN SODIUM 5000 [USP'U]/ML
5000 INJECTION, SOLUTION INTRAVENOUS; SUBCUTANEOUS 2 TIMES DAILY
Status: DISCONTINUED | OUTPATIENT
Start: 2018-05-15 | End: 2018-05-23 | Stop reason: HOSPADM

## 2018-05-15 RX ADMIN — BISACODYL 10 MG: 10 SUPPOSITORY RECTAL at 06:05

## 2018-05-15 RX ADMIN — CLONIDINE HYDROCHLORIDE 0.1 MG: 0.1 TABLET ORAL at 08:04

## 2018-05-15 RX ADMIN — POLYETHYLENE GLYCOL (3350) 17 G: 17 POWDER, FOR SOLUTION ORAL at 08:02

## 2018-05-15 RX ADMIN — OXYCODONE HYDROCHLORIDE AND ACETAMINOPHEN 2 TABLET: 5; 325 TABLET ORAL at 15:25

## 2018-05-15 RX ADMIN — OXYCODONE HYDROCHLORIDE AND ACETAMINOPHEN 2 TABLET: 5; 325 TABLET ORAL at 19:30

## 2018-05-15 RX ADMIN — FUROSEMIDE 40 MG: 40 TABLET ORAL at 08:02

## 2018-05-15 RX ADMIN — OXYCODONE HYDROCHLORIDE AND ACETAMINOPHEN 500 MG: 500 TABLET ORAL at 08:03

## 2018-05-15 RX ADMIN — LISINOPRIL 20 MG: 20 TABLET ORAL at 08:02

## 2018-05-15 RX ADMIN — CLONIDINE HYDROCHLORIDE 0.1 MG: 0.1 TABLET ORAL at 13:29

## 2018-05-15 RX ADMIN — HEPARIN SODIUM 5000 UNITS: 5000 INJECTION, SOLUTION INTRAVENOUS; SUBCUTANEOUS at 20:35

## 2018-05-15 RX ADMIN — HYDRALAZINE HYDROCHLORIDE 100 MG: 50 TABLET, FILM COATED ORAL at 06:02

## 2018-05-15 RX ADMIN — LISINOPRIL 20 MG: 20 TABLET ORAL at 20:34

## 2018-05-15 RX ADMIN — OXYCODONE HYDROCHLORIDE AND ACETAMINOPHEN 2 TABLET: 5; 325 TABLET ORAL at 09:55

## 2018-05-15 RX ADMIN — DOCUSATE SODIUM 100 MG: 100 CAPSULE, LIQUID FILLED ORAL at 20:34

## 2018-05-15 RX ADMIN — MULTIPLE VITAMINS W/ MINERALS TAB 1 TABLET: TAB at 08:02

## 2018-05-15 RX ADMIN — HYDRALAZINE HYDROCHLORIDE 100 MG: 50 TABLET, FILM COATED ORAL at 13:28

## 2018-05-15 RX ADMIN — CLONIDINE HYDROCHLORIDE 0.1 MG: 0.1 TABLET ORAL at 20:35

## 2018-05-15 RX ADMIN — FEBUXOSTAT 40 MG: 40 TABLET ORAL at 08:03

## 2018-05-15 RX ADMIN — LINAGLIPTIN 5 MG: 5 TABLET, FILM COATED ORAL at 08:04

## 2018-05-15 RX ADMIN — DOCUSATE SODIUM 100 MG: 100 CAPSULE, LIQUID FILLED ORAL at 08:02

## 2018-05-15 RX ADMIN — HYDRALAZINE HYDROCHLORIDE 100 MG: 50 TABLET, FILM COATED ORAL at 22:29

## 2018-05-15 RX ADMIN — INSULIN LISPRO 1 UNITS: 100 INJECTION, SOLUTION INTRAVENOUS; SUBCUTANEOUS at 20:42

## 2018-05-15 RX ADMIN — ATORVASTATIN CALCIUM 40 MG: 40 TABLET, FILM COATED ORAL at 20:34

## 2018-05-15 RX ADMIN — CARVEDILOL 25 MG: 25 TABLET, FILM COATED ORAL at 08:02

## 2018-05-15 RX ADMIN — ASPIRIN 81 MG 81 MG: 81 TABLET ORAL at 08:02

## 2018-05-15 ASSESSMENT — PAIN DESCRIPTION - PAIN TYPE: TYPE: SURGICAL PAIN

## 2018-05-15 ASSESSMENT — PAIN SCALES - GENERAL
PAINLEVEL_OUTOF10: 0
PAINLEVEL_OUTOF10: 4
PAINLEVEL_OUTOF10: 7
PAINLEVEL_OUTOF10: 4
PAINLEVEL_OUTOF10: 5

## 2018-05-15 ASSESSMENT — PAIN DESCRIPTION - ORIENTATION: ORIENTATION: LOWER

## 2018-05-15 ASSESSMENT — PAIN DESCRIPTION - LOCATION: LOCATION: BACK

## 2018-05-16 LAB
ANION GAP SERPL CALCULATED.3IONS-SCNC: 9 MMOL/L (ref 9–17)
BUN BLDV-MCNC: 23 MG/DL (ref 8–23)
BUN/CREAT BLD: ABNORMAL (ref 9–20)
CALCIUM SERPL-MCNC: 8.7 MG/DL (ref 8.6–10.4)
CHLORIDE BLD-SCNC: 101 MMOL/L (ref 98–107)
CO2: 32 MMOL/L (ref 20–31)
CREAT SERPL-MCNC: 1.53 MG/DL (ref 0.7–1.2)
GFR AFRICAN AMERICAN: 54 ML/MIN
GFR NON-AFRICAN AMERICAN: 44 ML/MIN
GFR SERPL CREATININE-BSD FRML MDRD: ABNORMAL ML/MIN/{1.73_M2}
GFR SERPL CREATININE-BSD FRML MDRD: ABNORMAL ML/MIN/{1.73_M2}
GLUCOSE BLD-MCNC: 123 MG/DL (ref 75–110)
GLUCOSE BLD-MCNC: 130 MG/DL (ref 75–110)
GLUCOSE BLD-MCNC: 131 MG/DL (ref 75–110)
GLUCOSE BLD-MCNC: 148 MG/DL (ref 75–110)
GLUCOSE BLD-MCNC: 154 MG/DL (ref 70–99)
POTASSIUM SERPL-SCNC: 4.1 MMOL/L (ref 3.7–5.3)
SODIUM BLD-SCNC: 142 MMOL/L (ref 135–144)

## 2018-05-16 PROCEDURE — 99232 SBSQ HOSP IP/OBS MODERATE 35: CPT | Performed by: PHYSICAL MEDICINE & REHABILITATION

## 2018-05-16 PROCEDURE — 97116 GAIT TRAINING THERAPY: CPT

## 2018-05-16 PROCEDURE — 82947 ASSAY GLUCOSE BLOOD QUANT: CPT

## 2018-05-16 PROCEDURE — 6370000000 HC RX 637 (ALT 250 FOR IP): Performed by: PHYSICAL MEDICINE & REHABILITATION

## 2018-05-16 PROCEDURE — 99232 SBSQ HOSP IP/OBS MODERATE 35: CPT | Performed by: INTERNAL MEDICINE

## 2018-05-16 PROCEDURE — 1180000000 HC REHAB R&B

## 2018-05-16 PROCEDURE — 36415 COLL VENOUS BLD VENIPUNCTURE: CPT

## 2018-05-16 PROCEDURE — 97150 GROUP THERAPEUTIC PROCEDURES: CPT

## 2018-05-16 PROCEDURE — 6360000002 HC RX W HCPCS: Performed by: PHYSICAL MEDICINE & REHABILITATION

## 2018-05-16 PROCEDURE — 80048 BASIC METABOLIC PNL TOTAL CA: CPT

## 2018-05-16 PROCEDURE — 97530 THERAPEUTIC ACTIVITIES: CPT

## 2018-05-16 PROCEDURE — 97110 THERAPEUTIC EXERCISES: CPT

## 2018-05-16 PROCEDURE — 97535 SELF CARE MNGMENT TRAINING: CPT

## 2018-05-16 RX ADMIN — ASPIRIN 81 MG 81 MG: 81 TABLET ORAL at 07:56

## 2018-05-16 RX ADMIN — LINAGLIPTIN 5 MG: 5 TABLET, FILM COATED ORAL at 07:57

## 2018-05-16 RX ADMIN — LISINOPRIL 20 MG: 20 TABLET ORAL at 20:50

## 2018-05-16 RX ADMIN — HYDRALAZINE HYDROCHLORIDE 100 MG: 50 TABLET, FILM COATED ORAL at 22:48

## 2018-05-16 RX ADMIN — OXYCODONE HYDROCHLORIDE AND ACETAMINOPHEN 1 TABLET: 5; 325 TABLET ORAL at 08:04

## 2018-05-16 RX ADMIN — HYDRALAZINE HYDROCHLORIDE 100 MG: 50 TABLET, FILM COATED ORAL at 05:30

## 2018-05-16 RX ADMIN — CLONIDINE HYDROCHLORIDE 0.1 MG: 0.1 TABLET ORAL at 20:54

## 2018-05-16 RX ADMIN — FEBUXOSTAT 40 MG: 40 TABLET ORAL at 07:57

## 2018-05-16 RX ADMIN — DOCUSATE SODIUM 100 MG: 100 CAPSULE, LIQUID FILLED ORAL at 20:50

## 2018-05-16 RX ADMIN — FUROSEMIDE 40 MG: 40 TABLET ORAL at 07:56

## 2018-05-16 RX ADMIN — LISINOPRIL 20 MG: 20 TABLET ORAL at 07:56

## 2018-05-16 RX ADMIN — CLONIDINE HYDROCHLORIDE 0.1 MG: 0.1 TABLET ORAL at 07:57

## 2018-05-16 RX ADMIN — MULTIPLE VITAMINS W/ MINERALS TAB 1 TABLET: TAB at 07:56

## 2018-05-16 RX ADMIN — OXYCODONE HYDROCHLORIDE AND ACETAMINOPHEN 2 TABLET: 5; 325 TABLET ORAL at 20:50

## 2018-05-16 RX ADMIN — POLYETHYLENE GLYCOL (3350) 17 G: 17 POWDER, FOR SOLUTION ORAL at 07:56

## 2018-05-16 RX ADMIN — HEPARIN SODIUM 5000 UNITS: 5000 INJECTION, SOLUTION INTRAVENOUS; SUBCUTANEOUS at 20:56

## 2018-05-16 RX ADMIN — OXYCODONE HYDROCHLORIDE AND ACETAMINOPHEN 2 TABLET: 5; 325 TABLET ORAL at 15:03

## 2018-05-16 RX ADMIN — OXYCODONE HYDROCHLORIDE AND ACETAMINOPHEN 2 TABLET: 5; 325 TABLET ORAL at 03:24

## 2018-05-16 RX ADMIN — HEPARIN SODIUM 5000 UNITS: 5000 INJECTION, SOLUTION INTRAVENOUS; SUBCUTANEOUS at 07:56

## 2018-05-16 RX ADMIN — CARVEDILOL 25 MG: 25 TABLET, FILM COATED ORAL at 07:56

## 2018-05-16 RX ADMIN — ATORVASTATIN CALCIUM 40 MG: 40 TABLET, FILM COATED ORAL at 20:50

## 2018-05-16 RX ADMIN — DOCUSATE SODIUM 100 MG: 100 CAPSULE, LIQUID FILLED ORAL at 07:56

## 2018-05-16 RX ADMIN — INSULIN LISPRO 1 UNITS: 100 INJECTION, SOLUTION INTRAVENOUS; SUBCUTANEOUS at 20:56

## 2018-05-16 ASSESSMENT — PAIN SCALES - GENERAL
PAINLEVEL_OUTOF10: 4
PAINLEVEL_OUTOF10: 5
PAINLEVEL_OUTOF10: 0
PAINLEVEL_OUTOF10: 6
PAINLEVEL_OUTOF10: 5
PAINLEVEL_OUTOF10: 5
PAINLEVEL_OUTOF10: 0
PAINLEVEL_OUTOF10: 4
PAINLEVEL_OUTOF10: 4

## 2018-05-16 ASSESSMENT — PAIN DESCRIPTION - ORIENTATION: ORIENTATION: LOWER

## 2018-05-16 ASSESSMENT — PAIN DESCRIPTION - PAIN TYPE: TYPE: SURGICAL PAIN;ACUTE PAIN

## 2018-05-16 ASSESSMENT — PAIN DESCRIPTION - LOCATION: LOCATION: BACK

## 2018-05-17 LAB
ANION GAP SERPL CALCULATED.3IONS-SCNC: 11 MMOL/L (ref 9–17)
BUN BLDV-MCNC: 25 MG/DL (ref 8–23)
BUN/CREAT BLD: ABNORMAL (ref 9–20)
CALCIUM SERPL-MCNC: 8.7 MG/DL (ref 8.6–10.4)
CHLORIDE BLD-SCNC: 103 MMOL/L (ref 98–107)
CO2: 31 MMOL/L (ref 20–31)
CREAT SERPL-MCNC: 1.65 MG/DL (ref 0.7–1.2)
GFR AFRICAN AMERICAN: 49 ML/MIN
GFR NON-AFRICAN AMERICAN: 41 ML/MIN
GFR SERPL CREATININE-BSD FRML MDRD: ABNORMAL ML/MIN/{1.73_M2}
GFR SERPL CREATININE-BSD FRML MDRD: ABNORMAL ML/MIN/{1.73_M2}
GLUCOSE BLD-MCNC: 108 MG/DL (ref 75–110)
GLUCOSE BLD-MCNC: 128 MG/DL (ref 75–110)
GLUCOSE BLD-MCNC: 135 MG/DL (ref 70–99)
GLUCOSE BLD-MCNC: 136 MG/DL (ref 75–110)
GLUCOSE BLD-MCNC: 223 MG/DL (ref 75–110)
HCT VFR BLD CALC: 27.3 % (ref 41–53)
HEMOGLOBIN: 9.2 G/DL (ref 13.5–17.5)
MCH RBC QN AUTO: 32.2 PG (ref 26–34)
MCHC RBC AUTO-ENTMCNC: 33.6 G/DL (ref 31–37)
MCV RBC AUTO: 95.7 FL (ref 80–100)
NRBC AUTOMATED: ABNORMAL PER 100 WBC
PDW BLD-RTO: 14.5 % (ref 11.5–14.9)
PLATELET # BLD: 226 K/UL (ref 150–450)
PMV BLD AUTO: 8.2 FL (ref 6–12)
POTASSIUM SERPL-SCNC: 4.1 MMOL/L (ref 3.7–5.3)
RBC # BLD: 2.85 M/UL (ref 4.5–5.9)
SODIUM BLD-SCNC: 145 MMOL/L (ref 135–144)
WBC # BLD: 4.7 K/UL (ref 3.5–11)

## 2018-05-17 PROCEDURE — 6370000000 HC RX 637 (ALT 250 FOR IP): Performed by: PHYSICAL MEDICINE & REHABILITATION

## 2018-05-17 PROCEDURE — 82947 ASSAY GLUCOSE BLOOD QUANT: CPT

## 2018-05-17 PROCEDURE — 36415 COLL VENOUS BLD VENIPUNCTURE: CPT

## 2018-05-17 PROCEDURE — 97530 THERAPEUTIC ACTIVITIES: CPT

## 2018-05-17 PROCEDURE — 85027 COMPLETE CBC AUTOMATED: CPT

## 2018-05-17 PROCEDURE — 97535 SELF CARE MNGMENT TRAINING: CPT

## 2018-05-17 PROCEDURE — 97110 THERAPEUTIC EXERCISES: CPT

## 2018-05-17 PROCEDURE — 97150 GROUP THERAPEUTIC PROCEDURES: CPT

## 2018-05-17 PROCEDURE — 99231 SBSQ HOSP IP/OBS SF/LOW 25: CPT | Performed by: INTERNAL MEDICINE

## 2018-05-17 PROCEDURE — 1180000000 HC REHAB R&B

## 2018-05-17 PROCEDURE — 6360000002 HC RX W HCPCS: Performed by: PHYSICAL MEDICINE & REHABILITATION

## 2018-05-17 PROCEDURE — 97116 GAIT TRAINING THERAPY: CPT

## 2018-05-17 PROCEDURE — 80048 BASIC METABOLIC PNL TOTAL CA: CPT

## 2018-05-17 PROCEDURE — 99232 SBSQ HOSP IP/OBS MODERATE 35: CPT | Performed by: PHYSICAL MEDICINE & REHABILITATION

## 2018-05-17 RX ORDER — OXYCODONE HYDROCHLORIDE 5 MG/1
5 TABLET ORAL EVERY 4 HOURS PRN
Status: DISCONTINUED | OUTPATIENT
Start: 2018-05-17 | End: 2018-05-23 | Stop reason: HOSPADM

## 2018-05-17 RX ORDER — OXYCODONE HYDROCHLORIDE 5 MG/1
10 TABLET ORAL EVERY 4 HOURS PRN
Status: DISCONTINUED | OUTPATIENT
Start: 2018-05-17 | End: 2018-05-23 | Stop reason: HOSPADM

## 2018-05-17 RX ADMIN — HEPARIN SODIUM 5000 UNITS: 5000 INJECTION, SOLUTION INTRAVENOUS; SUBCUTANEOUS at 20:33

## 2018-05-17 RX ADMIN — HEPARIN SODIUM 5000 UNITS: 5000 INJECTION, SOLUTION INTRAVENOUS; SUBCUTANEOUS at 07:58

## 2018-05-17 RX ADMIN — ASPIRIN 81 MG 81 MG: 81 TABLET ORAL at 07:55

## 2018-05-17 RX ADMIN — CLONIDINE HYDROCHLORIDE 0.1 MG: 0.1 TABLET ORAL at 15:48

## 2018-05-17 RX ADMIN — MULTIPLE VITAMINS W/ MINERALS TAB 1 TABLET: TAB at 07:55

## 2018-05-17 RX ADMIN — HYDRALAZINE HYDROCHLORIDE 100 MG: 50 TABLET, FILM COATED ORAL at 05:14

## 2018-05-17 RX ADMIN — OXYCODONE HYDROCHLORIDE 5 MG: 5 TABLET ORAL at 20:31

## 2018-05-17 RX ADMIN — CARVEDILOL 25 MG: 25 TABLET, FILM COATED ORAL at 07:55

## 2018-05-17 RX ADMIN — HYDRALAZINE HYDROCHLORIDE 100 MG: 50 TABLET, FILM COATED ORAL at 15:48

## 2018-05-17 RX ADMIN — DOCUSATE SODIUM 100 MG: 100 CAPSULE, LIQUID FILLED ORAL at 20:31

## 2018-05-17 RX ADMIN — OXYCODONE HYDROCHLORIDE AND ACETAMINOPHEN 500 MG: 500 TABLET ORAL at 07:58

## 2018-05-17 RX ADMIN — CLONIDINE HYDROCHLORIDE 0.1 MG: 0.1 TABLET ORAL at 20:32

## 2018-05-17 RX ADMIN — DOCUSATE SODIUM 100 MG: 100 CAPSULE, LIQUID FILLED ORAL at 07:55

## 2018-05-17 RX ADMIN — FEBUXOSTAT 40 MG: 40 TABLET ORAL at 07:57

## 2018-05-17 RX ADMIN — LINAGLIPTIN 5 MG: 5 TABLET, FILM COATED ORAL at 07:57

## 2018-05-17 RX ADMIN — INSULIN LISPRO 2 UNITS: 100 INJECTION, SOLUTION INTRAVENOUS; SUBCUTANEOUS at 20:33

## 2018-05-17 RX ADMIN — OXYCODONE HYDROCHLORIDE AND ACETAMINOPHEN 2 TABLET: 5; 325 TABLET ORAL at 05:13

## 2018-05-17 RX ADMIN — HYDRALAZINE HYDROCHLORIDE 100 MG: 50 TABLET, FILM COATED ORAL at 22:12

## 2018-05-17 RX ADMIN — CARVEDILOL 25 MG: 25 TABLET, FILM COATED ORAL at 18:05

## 2018-05-17 RX ADMIN — LISINOPRIL 20 MG: 20 TABLET ORAL at 20:31

## 2018-05-17 RX ADMIN — LISINOPRIL 20 MG: 20 TABLET ORAL at 07:55

## 2018-05-17 RX ADMIN — OXYCODONE HYDROCHLORIDE AND ACETAMINOPHEN 2 TABLET: 5; 325 TABLET ORAL at 13:30

## 2018-05-17 RX ADMIN — FUROSEMIDE 40 MG: 40 TABLET ORAL at 07:55

## 2018-05-17 RX ADMIN — CLONIDINE HYDROCHLORIDE 0.1 MG: 0.1 TABLET ORAL at 07:57

## 2018-05-17 RX ADMIN — POLYETHYLENE GLYCOL (3350) 17 G: 17 POWDER, FOR SOLUTION ORAL at 07:55

## 2018-05-17 RX ADMIN — ATORVASTATIN CALCIUM 40 MG: 40 TABLET, FILM COATED ORAL at 20:31

## 2018-05-17 ASSESSMENT — PAIN DESCRIPTION - FREQUENCY: FREQUENCY: INTERMITTENT

## 2018-05-17 ASSESSMENT — PAIN DESCRIPTION - PAIN TYPE
TYPE: SURGICAL PAIN;ACUTE PAIN
TYPE: SURGICAL PAIN;ACUTE PAIN

## 2018-05-17 ASSESSMENT — PAIN DESCRIPTION - ORIENTATION
ORIENTATION: LOWER
ORIENTATION: LOWER

## 2018-05-17 ASSESSMENT — PAIN DESCRIPTION - DESCRIPTORS: DESCRIPTORS: ACHING;DISCOMFORT

## 2018-05-17 ASSESSMENT — PAIN SCALES - GENERAL
PAINLEVEL_OUTOF10: 6
PAINLEVEL_OUTOF10: 5
PAINLEVEL_OUTOF10: 5
PAINLEVEL_OUTOF10: 0
PAINLEVEL_OUTOF10: 2
PAINLEVEL_OUTOF10: 5
PAINLEVEL_OUTOF10: 4

## 2018-05-17 ASSESSMENT — PAIN DESCRIPTION - ONSET: ONSET: ON-GOING

## 2018-05-17 ASSESSMENT — PAIN DESCRIPTION - LOCATION
LOCATION: BACK
LOCATION: BACK

## 2018-05-17 ASSESSMENT — PAIN DESCRIPTION - PROGRESSION: CLINICAL_PROGRESSION: GRADUALLY IMPROVING

## 2018-05-18 LAB
GLUCOSE BLD-MCNC: 108 MG/DL (ref 75–110)
GLUCOSE BLD-MCNC: 153 MG/DL (ref 75–110)
GLUCOSE BLD-MCNC: 160 MG/DL (ref 75–110)
GLUCOSE BLD-MCNC: 85 MG/DL (ref 75–110)

## 2018-05-18 PROCEDURE — 1180000000 HC REHAB R&B

## 2018-05-18 PROCEDURE — 97110 THERAPEUTIC EXERCISES: CPT

## 2018-05-18 PROCEDURE — 99232 SBSQ HOSP IP/OBS MODERATE 35: CPT | Performed by: INTERNAL MEDICINE

## 2018-05-18 PROCEDURE — 97530 THERAPEUTIC ACTIVITIES: CPT

## 2018-05-18 PROCEDURE — 97116 GAIT TRAINING THERAPY: CPT

## 2018-05-18 PROCEDURE — 6370000000 HC RX 637 (ALT 250 FOR IP): Performed by: PHYSICAL MEDICINE & REHABILITATION

## 2018-05-18 PROCEDURE — 99232 SBSQ HOSP IP/OBS MODERATE 35: CPT | Performed by: PHYSICAL MEDICINE & REHABILITATION

## 2018-05-18 PROCEDURE — 82947 ASSAY GLUCOSE BLOOD QUANT: CPT

## 2018-05-18 PROCEDURE — 6360000002 HC RX W HCPCS: Performed by: PHYSICAL MEDICINE & REHABILITATION

## 2018-05-18 PROCEDURE — 97535 SELF CARE MNGMENT TRAINING: CPT

## 2018-05-18 RX ADMIN — INSULIN LISPRO 1 UNITS: 100 INJECTION, SOLUTION INTRAVENOUS; SUBCUTANEOUS at 20:16

## 2018-05-18 RX ADMIN — CLONIDINE HYDROCHLORIDE 0.1 MG: 0.1 TABLET ORAL at 15:37

## 2018-05-18 RX ADMIN — FEBUXOSTAT 40 MG: 40 TABLET ORAL at 07:58

## 2018-05-18 RX ADMIN — OXYCODONE HYDROCHLORIDE AND ACETAMINOPHEN 500 MG: 500 TABLET ORAL at 07:59

## 2018-05-18 RX ADMIN — CLONIDINE HYDROCHLORIDE 0.1 MG: 0.1 TABLET ORAL at 07:58

## 2018-05-18 RX ADMIN — LINAGLIPTIN 5 MG: 5 TABLET, FILM COATED ORAL at 07:58

## 2018-05-18 RX ADMIN — INSULIN LISPRO 2 UNITS: 100 INJECTION, SOLUTION INTRAVENOUS; SUBCUTANEOUS at 08:00

## 2018-05-18 RX ADMIN — SENNOSIDES 17.2 MG: 8.6 TABLET, FILM COATED ORAL at 20:03

## 2018-05-18 RX ADMIN — ATORVASTATIN CALCIUM 40 MG: 40 TABLET, FILM COATED ORAL at 20:03

## 2018-05-18 RX ADMIN — DOCUSATE SODIUM 100 MG: 100 CAPSULE, LIQUID FILLED ORAL at 20:03

## 2018-05-18 RX ADMIN — HYDRALAZINE HYDROCHLORIDE 100 MG: 50 TABLET, FILM COATED ORAL at 15:36

## 2018-05-18 RX ADMIN — OXYCODONE HYDROCHLORIDE 10 MG: 5 TABLET ORAL at 15:36

## 2018-05-18 RX ADMIN — FUROSEMIDE 40 MG: 40 TABLET ORAL at 07:58

## 2018-05-18 RX ADMIN — DOCUSATE SODIUM 100 MG: 100 CAPSULE, LIQUID FILLED ORAL at 07:57

## 2018-05-18 RX ADMIN — LISINOPRIL 20 MG: 20 TABLET ORAL at 07:58

## 2018-05-18 RX ADMIN — CLONIDINE HYDROCHLORIDE 0.1 MG: 0.1 TABLET ORAL at 20:12

## 2018-05-18 RX ADMIN — CARVEDILOL 25 MG: 25 TABLET, FILM COATED ORAL at 17:34

## 2018-05-18 RX ADMIN — CARVEDILOL 25 MG: 25 TABLET, FILM COATED ORAL at 07:58

## 2018-05-18 RX ADMIN — ASPIRIN 81 MG 81 MG: 81 TABLET ORAL at 07:58

## 2018-05-18 RX ADMIN — LISINOPRIL 20 MG: 20 TABLET ORAL at 20:03

## 2018-05-18 RX ADMIN — OXYCODONE HYDROCHLORIDE 10 MG: 5 TABLET ORAL at 08:04

## 2018-05-18 RX ADMIN — HEPARIN SODIUM 5000 UNITS: 5000 INJECTION, SOLUTION INTRAVENOUS; SUBCUTANEOUS at 08:00

## 2018-05-18 RX ADMIN — HYDRALAZINE HYDROCHLORIDE 100 MG: 50 TABLET, FILM COATED ORAL at 22:20

## 2018-05-18 RX ADMIN — MULTIPLE VITAMINS W/ MINERALS TAB 1 TABLET: TAB at 07:58

## 2018-05-18 RX ADMIN — HEPARIN SODIUM 5000 UNITS: 5000 INJECTION, SOLUTION INTRAVENOUS; SUBCUTANEOUS at 20:04

## 2018-05-18 RX ADMIN — OXYCODONE HYDROCHLORIDE 10 MG: 5 TABLET ORAL at 22:20

## 2018-05-18 RX ADMIN — HYDRALAZINE HYDROCHLORIDE 100 MG: 50 TABLET, FILM COATED ORAL at 06:31

## 2018-05-18 ASSESSMENT — PAIN SCALES - GENERAL
PAINLEVEL_OUTOF10: 3
PAINLEVEL_OUTOF10: 4
PAINLEVEL_OUTOF10: 3
PAINLEVEL_OUTOF10: 6
PAINLEVEL_OUTOF10: 7
PAINLEVEL_OUTOF10: 5
PAINLEVEL_OUTOF10: 6
PAINLEVEL_OUTOF10: 5

## 2018-05-18 ASSESSMENT — PAIN DESCRIPTION - ORIENTATION: ORIENTATION: LOWER

## 2018-05-18 ASSESSMENT — PAIN DESCRIPTION - PROGRESSION: CLINICAL_PROGRESSION: GRADUALLY IMPROVING

## 2018-05-18 ASSESSMENT — PAIN DESCRIPTION - FREQUENCY: FREQUENCY: INTERMITTENT

## 2018-05-18 ASSESSMENT — PAIN DESCRIPTION - PAIN TYPE: TYPE: SURGICAL PAIN

## 2018-05-18 ASSESSMENT — PAIN DESCRIPTION - DESCRIPTORS: DESCRIPTORS: ACHING

## 2018-05-18 ASSESSMENT — PAIN DESCRIPTION - LOCATION: LOCATION: BACK

## 2018-05-19 LAB
ANION GAP SERPL CALCULATED.3IONS-SCNC: 9 MMOL/L (ref 9–17)
BUN BLDV-MCNC: 29 MG/DL (ref 8–23)
BUN/CREAT BLD: ABNORMAL (ref 9–20)
CALCIUM SERPL-MCNC: 8.8 MG/DL (ref 8.6–10.4)
CHLORIDE BLD-SCNC: 102 MMOL/L (ref 98–107)
CO2: 29 MMOL/L (ref 20–31)
CREAT SERPL-MCNC: 1.8 MG/DL (ref 0.7–1.2)
GFR AFRICAN AMERICAN: 45 ML/MIN
GFR NON-AFRICAN AMERICAN: 37 ML/MIN
GFR SERPL CREATININE-BSD FRML MDRD: ABNORMAL ML/MIN/{1.73_M2}
GFR SERPL CREATININE-BSD FRML MDRD: ABNORMAL ML/MIN/{1.73_M2}
GLUCOSE BLD-MCNC: 129 MG/DL (ref 75–110)
GLUCOSE BLD-MCNC: 136 MG/DL (ref 75–110)
GLUCOSE BLD-MCNC: 151 MG/DL (ref 75–110)
GLUCOSE BLD-MCNC: 185 MG/DL (ref 70–99)
GLUCOSE BLD-MCNC: 96 MG/DL (ref 75–110)
HCT VFR BLD CALC: 28.2 % (ref 41–53)
HEMOGLOBIN: 9.4 G/DL (ref 13.5–17.5)
POTASSIUM SERPL-SCNC: 4.3 MMOL/L (ref 3.7–5.3)
SODIUM BLD-SCNC: 140 MMOL/L (ref 135–144)

## 2018-05-19 PROCEDURE — 80048 BASIC METABOLIC PNL TOTAL CA: CPT

## 2018-05-19 PROCEDURE — 97530 THERAPEUTIC ACTIVITIES: CPT

## 2018-05-19 PROCEDURE — 82947 ASSAY GLUCOSE BLOOD QUANT: CPT

## 2018-05-19 PROCEDURE — 97116 GAIT TRAINING THERAPY: CPT

## 2018-05-19 PROCEDURE — 6360000002 HC RX W HCPCS: Performed by: PHYSICAL MEDICINE & REHABILITATION

## 2018-05-19 PROCEDURE — 85018 HEMOGLOBIN: CPT

## 2018-05-19 PROCEDURE — 6370000000 HC RX 637 (ALT 250 FOR IP): Performed by: PHYSICAL MEDICINE & REHABILITATION

## 2018-05-19 PROCEDURE — 99232 SBSQ HOSP IP/OBS MODERATE 35: CPT | Performed by: INTERNAL MEDICINE

## 2018-05-19 PROCEDURE — 99232 SBSQ HOSP IP/OBS MODERATE 35: CPT | Performed by: PHYSICAL MEDICINE & REHABILITATION

## 2018-05-19 PROCEDURE — 97110 THERAPEUTIC EXERCISES: CPT

## 2018-05-19 PROCEDURE — 36415 COLL VENOUS BLD VENIPUNCTURE: CPT

## 2018-05-19 PROCEDURE — 85014 HEMATOCRIT: CPT

## 2018-05-19 PROCEDURE — 97535 SELF CARE MNGMENT TRAINING: CPT

## 2018-05-19 PROCEDURE — 1180000000 HC REHAB R&B

## 2018-05-19 RX ORDER — HYDRALAZINE HYDROCHLORIDE 50 MG/1
100 TABLET, FILM COATED ORAL EVERY 6 HOURS SCHEDULED
Status: DISCONTINUED | OUTPATIENT
Start: 2018-05-20 | End: 2018-05-23 | Stop reason: HOSPADM

## 2018-05-19 RX ADMIN — CLONIDINE HYDROCHLORIDE 0.1 MG: 0.1 TABLET ORAL at 07:46

## 2018-05-19 RX ADMIN — OXYCODONE HYDROCHLORIDE 5 MG: 5 TABLET ORAL at 20:44

## 2018-05-19 RX ADMIN — INSULIN LISPRO 1 UNITS: 100 INJECTION, SOLUTION INTRAVENOUS; SUBCUTANEOUS at 20:47

## 2018-05-19 RX ADMIN — ASPIRIN 81 MG 81 MG: 81 TABLET ORAL at 07:35

## 2018-05-19 RX ADMIN — CLONIDINE HYDROCHLORIDE 0.1 MG: 0.1 TABLET ORAL at 15:33

## 2018-05-19 RX ADMIN — CARVEDILOL 25 MG: 25 TABLET, FILM COATED ORAL at 17:25

## 2018-05-19 RX ADMIN — HYDRALAZINE HYDROCHLORIDE 100 MG: 50 TABLET, FILM COATED ORAL at 06:04

## 2018-05-19 RX ADMIN — OXYCODONE HYDROCHLORIDE AND ACETAMINOPHEN 500 MG: 500 TABLET ORAL at 07:46

## 2018-05-19 RX ADMIN — OXYCODONE HYDROCHLORIDE 5 MG: 5 TABLET ORAL at 07:37

## 2018-05-19 RX ADMIN — HEPARIN SODIUM 5000 UNITS: 5000 INJECTION, SOLUTION INTRAVENOUS; SUBCUTANEOUS at 07:37

## 2018-05-19 RX ADMIN — LINAGLIPTIN 5 MG: 5 TABLET, FILM COATED ORAL at 07:46

## 2018-05-19 RX ADMIN — HYDRALAZINE HYDROCHLORIDE 100 MG: 50 TABLET, FILM COATED ORAL at 15:32

## 2018-05-19 RX ADMIN — FEBUXOSTAT 40 MG: 40 TABLET ORAL at 07:45

## 2018-05-19 RX ADMIN — MULTIPLE VITAMINS W/ MINERALS TAB 1 TABLET: TAB at 07:35

## 2018-05-19 RX ADMIN — DOCUSATE SODIUM 100 MG: 100 CAPSULE, LIQUID FILLED ORAL at 20:44

## 2018-05-19 RX ADMIN — HEPARIN SODIUM 5000 UNITS: 5000 INJECTION, SOLUTION INTRAVENOUS; SUBCUTANEOUS at 20:47

## 2018-05-19 RX ADMIN — OXYCODONE HYDROCHLORIDE 5 MG: 5 TABLET ORAL at 12:00

## 2018-05-19 RX ADMIN — BISACODYL 10 MG: 10 SUPPOSITORY RECTAL at 18:35

## 2018-05-19 RX ADMIN — CARVEDILOL 25 MG: 25 TABLET, FILM COATED ORAL at 07:35

## 2018-05-19 RX ADMIN — POLYETHYLENE GLYCOL (3350) 17 G: 17 POWDER, FOR SOLUTION ORAL at 07:35

## 2018-05-19 RX ADMIN — LISINOPRIL 20 MG: 20 TABLET ORAL at 07:35

## 2018-05-19 RX ADMIN — CLONIDINE HYDROCHLORIDE 0.1 MG: 0.1 TABLET ORAL at 20:45

## 2018-05-19 RX ADMIN — ATORVASTATIN CALCIUM 40 MG: 40 TABLET, FILM COATED ORAL at 20:44

## 2018-05-19 RX ADMIN — DOCUSATE SODIUM 100 MG: 100 CAPSULE, LIQUID FILLED ORAL at 07:35

## 2018-05-19 ASSESSMENT — PAIN DESCRIPTION - ONSET: ONSET: ON-GOING

## 2018-05-19 ASSESSMENT — PAIN DESCRIPTION - ORIENTATION
ORIENTATION: LOWER
ORIENTATION: LOWER

## 2018-05-19 ASSESSMENT — PAIN DESCRIPTION - PROGRESSION
CLINICAL_PROGRESSION: GRADUALLY IMPROVING
CLINICAL_PROGRESSION: GRADUALLY IMPROVING

## 2018-05-19 ASSESSMENT — PAIN SCALES - GENERAL
PAINLEVEL_OUTOF10: 4
PAINLEVEL_OUTOF10: 4
PAINLEVEL_OUTOF10: 0
PAINLEVEL_OUTOF10: 4
PAINLEVEL_OUTOF10: 2

## 2018-05-19 ASSESSMENT — PAIN DESCRIPTION - PAIN TYPE
TYPE: SURGICAL PAIN
TYPE: SURGICAL PAIN

## 2018-05-19 ASSESSMENT — PAIN DESCRIPTION - FREQUENCY: FREQUENCY: INTERMITTENT

## 2018-05-19 ASSESSMENT — PAIN DESCRIPTION - LOCATION
LOCATION: BACK
LOCATION: BACK

## 2018-05-19 ASSESSMENT — PAIN DESCRIPTION - DESCRIPTORS
DESCRIPTORS: ACHING
DESCRIPTORS: ACHING

## 2018-05-20 LAB
GLUCOSE BLD-MCNC: 106 MG/DL (ref 75–110)
GLUCOSE BLD-MCNC: 107 MG/DL (ref 75–110)
GLUCOSE BLD-MCNC: 185 MG/DL (ref 75–110)

## 2018-05-20 PROCEDURE — 6370000000 HC RX 637 (ALT 250 FOR IP): Performed by: PHYSICAL MEDICINE & REHABILITATION

## 2018-05-20 PROCEDURE — 82947 ASSAY GLUCOSE BLOOD QUANT: CPT

## 2018-05-20 PROCEDURE — 97116 GAIT TRAINING THERAPY: CPT

## 2018-05-20 PROCEDURE — 99232 SBSQ HOSP IP/OBS MODERATE 35: CPT | Performed by: INTERNAL MEDICINE

## 2018-05-20 PROCEDURE — 97110 THERAPEUTIC EXERCISES: CPT

## 2018-05-20 PROCEDURE — 6360000002 HC RX W HCPCS: Performed by: PHYSICAL MEDICINE & REHABILITATION

## 2018-05-20 PROCEDURE — 6370000000 HC RX 637 (ALT 250 FOR IP): Performed by: INTERNAL MEDICINE

## 2018-05-20 PROCEDURE — 99232 SBSQ HOSP IP/OBS MODERATE 35: CPT | Performed by: PHYSICAL MEDICINE & REHABILITATION

## 2018-05-20 PROCEDURE — 1180000000 HC REHAB R&B

## 2018-05-20 RX ADMIN — ATORVASTATIN CALCIUM 40 MG: 40 TABLET, FILM COATED ORAL at 20:39

## 2018-05-20 RX ADMIN — DOCUSATE SODIUM 100 MG: 100 CAPSULE, LIQUID FILLED ORAL at 20:40

## 2018-05-20 RX ADMIN — DOCUSATE SODIUM 100 MG: 100 CAPSULE, LIQUID FILLED ORAL at 08:17

## 2018-05-20 RX ADMIN — HYDRALAZINE HYDROCHLORIDE 100 MG: 50 TABLET, FILM COATED ORAL at 06:06

## 2018-05-20 RX ADMIN — OXYCODONE HYDROCHLORIDE 5 MG: 5 TABLET ORAL at 08:33

## 2018-05-20 RX ADMIN — INSULIN LISPRO 1 UNITS: 100 INJECTION, SOLUTION INTRAVENOUS; SUBCUTANEOUS at 20:42

## 2018-05-20 RX ADMIN — MULTIPLE VITAMINS W/ MINERALS TAB 1 TABLET: TAB at 08:26

## 2018-05-20 RX ADMIN — ASPIRIN 81 MG 81 MG: 81 TABLET ORAL at 08:17

## 2018-05-20 RX ADMIN — FEBUXOSTAT 40 MG: 40 TABLET ORAL at 08:18

## 2018-05-20 RX ADMIN — HEPARIN SODIUM 5000 UNITS: 5000 INJECTION, SOLUTION INTRAVENOUS; SUBCUTANEOUS at 08:17

## 2018-05-20 RX ADMIN — CLONIDINE HYDROCHLORIDE 0.1 MG: 0.1 TABLET ORAL at 15:13

## 2018-05-20 RX ADMIN — HYDRALAZINE HYDROCHLORIDE 100 MG: 50 TABLET, FILM COATED ORAL at 15:13

## 2018-05-20 RX ADMIN — CLONIDINE HYDROCHLORIDE 0.1 MG: 0.1 TABLET ORAL at 20:41

## 2018-05-20 RX ADMIN — CLONIDINE HYDROCHLORIDE 0.1 MG: 0.1 TABLET ORAL at 08:18

## 2018-05-20 RX ADMIN — CARVEDILOL 25 MG: 25 TABLET, FILM COATED ORAL at 08:17

## 2018-05-20 RX ADMIN — CARVEDILOL 25 MG: 25 TABLET, FILM COATED ORAL at 17:27

## 2018-05-20 RX ADMIN — OXYCODONE HYDROCHLORIDE 5 MG: 5 TABLET ORAL at 17:27

## 2018-05-20 RX ADMIN — LINAGLIPTIN 5 MG: 5 TABLET, FILM COATED ORAL at 08:21

## 2018-05-20 RX ADMIN — POLYETHYLENE GLYCOL (3350) 17 G: 17 POWDER, FOR SOLUTION ORAL at 08:20

## 2018-05-20 RX ADMIN — HEPARIN SODIUM 5000 UNITS: 5000 INJECTION, SOLUTION INTRAVENOUS; SUBCUTANEOUS at 20:41

## 2018-05-20 RX ADMIN — OXYCODONE HYDROCHLORIDE AND ACETAMINOPHEN 500 MG: 500 TABLET ORAL at 08:20

## 2018-05-20 RX ADMIN — HYDRALAZINE HYDROCHLORIDE 100 MG: 50 TABLET, FILM COATED ORAL at 00:05

## 2018-05-20 ASSESSMENT — PAIN SCALES - GENERAL
PAINLEVEL_OUTOF10: 5
PAINLEVEL_OUTOF10: 3
PAINLEVEL_OUTOF10: 3
PAINLEVEL_OUTOF10: 0
PAINLEVEL_OUTOF10: 4

## 2018-05-20 ASSESSMENT — PAIN DESCRIPTION - PROGRESSION: CLINICAL_PROGRESSION: GRADUALLY IMPROVING

## 2018-05-21 LAB
ANION GAP SERPL CALCULATED.3IONS-SCNC: 9 MMOL/L (ref 9–17)
BUN BLDV-MCNC: 26 MG/DL (ref 8–23)
BUN/CREAT BLD: ABNORMAL (ref 9–20)
CALCIUM SERPL-MCNC: 8.5 MG/DL (ref 8.6–10.4)
CHLORIDE BLD-SCNC: 103 MMOL/L (ref 98–107)
CO2: 28 MMOL/L (ref 20–31)
CREAT SERPL-MCNC: 1.6 MG/DL (ref 0.7–1.2)
GFR AFRICAN AMERICAN: 51 ML/MIN
GFR NON-AFRICAN AMERICAN: 42 ML/MIN
GFR SERPL CREATININE-BSD FRML MDRD: ABNORMAL ML/MIN/{1.73_M2}
GFR SERPL CREATININE-BSD FRML MDRD: ABNORMAL ML/MIN/{1.73_M2}
GLUCOSE BLD-MCNC: 112 MG/DL (ref 75–110)
GLUCOSE BLD-MCNC: 114 MG/DL (ref 75–110)
GLUCOSE BLD-MCNC: 123 MG/DL (ref 70–99)
GLUCOSE BLD-MCNC: 212 MG/DL (ref 75–110)
GLUCOSE BLD-MCNC: 95 MG/DL (ref 75–110)
POTASSIUM SERPL-SCNC: 4 MMOL/L (ref 3.7–5.3)
SODIUM BLD-SCNC: 140 MMOL/L (ref 135–144)

## 2018-05-21 PROCEDURE — 99232 SBSQ HOSP IP/OBS MODERATE 35: CPT | Performed by: PHYSICAL MEDICINE & REHABILITATION

## 2018-05-21 PROCEDURE — 97110 THERAPEUTIC EXERCISES: CPT

## 2018-05-21 PROCEDURE — 97535 SELF CARE MNGMENT TRAINING: CPT

## 2018-05-21 PROCEDURE — 6370000000 HC RX 637 (ALT 250 FOR IP): Performed by: PHYSICAL MEDICINE & REHABILITATION

## 2018-05-21 PROCEDURE — 80048 BASIC METABOLIC PNL TOTAL CA: CPT

## 2018-05-21 PROCEDURE — 1180000000 HC REHAB R&B

## 2018-05-21 PROCEDURE — 97116 GAIT TRAINING THERAPY: CPT

## 2018-05-21 PROCEDURE — 97530 THERAPEUTIC ACTIVITIES: CPT

## 2018-05-21 PROCEDURE — 6370000000 HC RX 637 (ALT 250 FOR IP): Performed by: INTERNAL MEDICINE

## 2018-05-21 PROCEDURE — 99232 SBSQ HOSP IP/OBS MODERATE 35: CPT | Performed by: INTERNAL MEDICINE

## 2018-05-21 PROCEDURE — 6360000002 HC RX W HCPCS: Performed by: PHYSICAL MEDICINE & REHABILITATION

## 2018-05-21 PROCEDURE — 82947 ASSAY GLUCOSE BLOOD QUANT: CPT

## 2018-05-21 PROCEDURE — 36415 COLL VENOUS BLD VENIPUNCTURE: CPT

## 2018-05-21 RX ADMIN — ASPIRIN 81 MG 81 MG: 81 TABLET ORAL at 07:32

## 2018-05-21 RX ADMIN — CARVEDILOL 25 MG: 25 TABLET, FILM COATED ORAL at 07:33

## 2018-05-21 RX ADMIN — OXYCODONE HYDROCHLORIDE 10 MG: 5 TABLET ORAL at 07:33

## 2018-05-21 RX ADMIN — OXYCODONE HYDROCHLORIDE 5 MG: 5 TABLET ORAL at 02:23

## 2018-05-21 RX ADMIN — MULTIPLE VITAMINS W/ MINERALS TAB 1 TABLET: TAB at 07:33

## 2018-05-21 RX ADMIN — HEPARIN SODIUM 5000 UNITS: 5000 INJECTION, SOLUTION INTRAVENOUS; SUBCUTANEOUS at 07:38

## 2018-05-21 RX ADMIN — OXYCODONE HYDROCHLORIDE 10 MG: 5 TABLET ORAL at 17:29

## 2018-05-21 RX ADMIN — FEBUXOSTAT 40 MG: 40 TABLET ORAL at 07:38

## 2018-05-21 RX ADMIN — INSULIN LISPRO 2 UNITS: 100 INJECTION, SOLUTION INTRAVENOUS; SUBCUTANEOUS at 20:43

## 2018-05-21 RX ADMIN — DOCUSATE SODIUM 100 MG: 100 CAPSULE, LIQUID FILLED ORAL at 07:33

## 2018-05-21 RX ADMIN — CLONIDINE HYDROCHLORIDE 0.1 MG: 0.1 TABLET ORAL at 07:37

## 2018-05-21 RX ADMIN — POLYETHYLENE GLYCOL (3350) 17 G: 17 POWDER, FOR SOLUTION ORAL at 07:32

## 2018-05-21 RX ADMIN — HYDRALAZINE HYDROCHLORIDE 100 MG: 50 TABLET, FILM COATED ORAL at 23:06

## 2018-05-21 RX ADMIN — DOCUSATE SODIUM 100 MG: 100 CAPSULE, LIQUID FILLED ORAL at 20:35

## 2018-05-21 RX ADMIN — CARVEDILOL 25 MG: 25 TABLET, FILM COATED ORAL at 17:29

## 2018-05-21 RX ADMIN — HYDRALAZINE HYDROCHLORIDE 100 MG: 50 TABLET, FILM COATED ORAL at 06:26

## 2018-05-21 RX ADMIN — OXYCODONE HYDROCHLORIDE AND ACETAMINOPHEN 500 MG: 500 TABLET ORAL at 07:37

## 2018-05-21 RX ADMIN — CLONIDINE HYDROCHLORIDE 0.1 MG: 0.1 TABLET ORAL at 20:35

## 2018-05-21 RX ADMIN — HYDRALAZINE HYDROCHLORIDE 100 MG: 50 TABLET, FILM COATED ORAL at 17:34

## 2018-05-21 RX ADMIN — LINAGLIPTIN 5 MG: 5 TABLET, FILM COATED ORAL at 07:38

## 2018-05-21 RX ADMIN — ATORVASTATIN CALCIUM 40 MG: 40 TABLET, FILM COATED ORAL at 20:35

## 2018-05-21 RX ADMIN — HYDRALAZINE HYDROCHLORIDE 100 MG: 50 TABLET, FILM COATED ORAL at 00:16

## 2018-05-21 RX ADMIN — SENNOSIDES 17.2 MG: 8.6 TABLET, FILM COATED ORAL at 20:35

## 2018-05-21 RX ADMIN — HEPARIN SODIUM 5000 UNITS: 5000 INJECTION, SOLUTION INTRAVENOUS; SUBCUTANEOUS at 20:35

## 2018-05-21 ASSESSMENT — PAIN SCALES - GENERAL
PAINLEVEL_OUTOF10: 0
PAINLEVEL_OUTOF10: 5
PAINLEVEL_OUTOF10: 5
PAINLEVEL_OUTOF10: 6

## 2018-05-21 ASSESSMENT — PAIN DESCRIPTION - ORIENTATION
ORIENTATION_2: LEFT;RIGHT
ORIENTATION: LOWER

## 2018-05-21 ASSESSMENT — PAIN DESCRIPTION - LOCATION
LOCATION: BACK
LOCATION_2: FOOT

## 2018-05-21 ASSESSMENT — PAIN DESCRIPTION - PAIN TYPE
TYPE_2: NEUROPATHIC
TYPE: SURGICAL PAIN

## 2018-05-21 ASSESSMENT — PAIN DESCRIPTION - DURATION: DURATION_2: CONTINUOUS

## 2018-05-21 ASSESSMENT — PAIN DESCRIPTION - PROGRESSION: CLINICAL_PROGRESSION: GRADUALLY IMPROVING

## 2018-05-22 LAB
GLUCOSE BLD-MCNC: 105 MG/DL (ref 75–110)
GLUCOSE BLD-MCNC: 145 MG/DL (ref 75–110)
GLUCOSE BLD-MCNC: 153 MG/DL (ref 75–110)
GLUCOSE BLD-MCNC: 90 MG/DL (ref 75–110)

## 2018-05-22 PROCEDURE — 97535 SELF CARE MNGMENT TRAINING: CPT

## 2018-05-22 PROCEDURE — 97110 THERAPEUTIC EXERCISES: CPT

## 2018-05-22 PROCEDURE — 6360000002 HC RX W HCPCS: Performed by: PHYSICAL MEDICINE & REHABILITATION

## 2018-05-22 PROCEDURE — 6370000000 HC RX 637 (ALT 250 FOR IP): Performed by: PHYSICAL MEDICINE & REHABILITATION

## 2018-05-22 PROCEDURE — 99232 SBSQ HOSP IP/OBS MODERATE 35: CPT | Performed by: PHYSICAL MEDICINE & REHABILITATION

## 2018-05-22 PROCEDURE — 97116 GAIT TRAINING THERAPY: CPT

## 2018-05-22 PROCEDURE — 6370000000 HC RX 637 (ALT 250 FOR IP): Performed by: INTERNAL MEDICINE

## 2018-05-22 PROCEDURE — 1180000000 HC REHAB R&B

## 2018-05-22 PROCEDURE — 97530 THERAPEUTIC ACTIVITIES: CPT

## 2018-05-22 PROCEDURE — 82947 ASSAY GLUCOSE BLOOD QUANT: CPT

## 2018-05-22 PROCEDURE — 6370000000 HC RX 637 (ALT 250 FOR IP): Performed by: NURSE PRACTITIONER

## 2018-05-22 RX ORDER — PSEUDOEPHEDRINE HCL 30 MG
100 TABLET ORAL 2 TIMES DAILY
Qty: 60 CAPSULE | Refills: 0 | Status: SHIPPED | OUTPATIENT
Start: 2018-05-22

## 2018-05-22 RX ORDER — POLYETHYLENE GLYCOL 3350 17 G/17G
17 POWDER, FOR SOLUTION ORAL DAILY
Qty: 527 G | Refills: 1 | Status: SHIPPED | OUTPATIENT
Start: 2018-05-23 | End: 2018-06-22

## 2018-05-22 RX ORDER — METHOCARBAMOL 750 MG/1
750 TABLET, FILM COATED ORAL 3 TIMES DAILY PRN
Qty: 30 TABLET | Refills: 0 | Status: SHIPPED | OUTPATIENT
Start: 2018-05-22 | End: 2018-06-01

## 2018-05-22 RX ORDER — MULTIVIT WITH MINERALS/LUTEIN
250 TABLET ORAL DAILY
Status: DISCONTINUED | OUTPATIENT
Start: 2018-05-23 | End: 2018-05-23 | Stop reason: HOSPADM

## 2018-05-22 RX ORDER — ASPIRIN 81 MG/1
81 TABLET, CHEWABLE ORAL DAILY
Qty: 30 TABLET | Refills: 3 | Status: SHIPPED | OUTPATIENT
Start: 2018-05-23

## 2018-05-22 RX ORDER — CLONIDINE HYDROCHLORIDE 0.1 MG/1
0.1 TABLET ORAL 2 TIMES DAILY
Status: DISCONTINUED | OUTPATIENT
Start: 2018-05-22 | End: 2018-05-23 | Stop reason: HOSPADM

## 2018-05-22 RX ORDER — OXYCODONE HYDROCHLORIDE 5 MG/1
5 TABLET ORAL EVERY 8 HOURS PRN
Qty: 21 TABLET | Refills: 0 | Status: SHIPPED | OUTPATIENT
Start: 2018-05-22 | End: 2018-05-29

## 2018-05-22 RX ORDER — SENNA PLUS 8.6 MG/1
2 TABLET ORAL DAILY PRN
Qty: 60 TABLET | Refills: 0 | Status: SHIPPED | OUTPATIENT
Start: 2018-05-22 | End: 2018-06-21

## 2018-05-22 RX ADMIN — MULTIPLE VITAMINS W/ MINERALS TAB 1 TABLET: TAB at 10:33

## 2018-05-22 RX ADMIN — OXYCODONE HYDROCHLORIDE 5 MG: 5 TABLET ORAL at 17:37

## 2018-05-22 RX ADMIN — OXYCODONE HYDROCHLORIDE 5 MG: 5 TABLET ORAL at 10:38

## 2018-05-22 RX ADMIN — HYDRALAZINE HYDROCHLORIDE 100 MG: 50 TABLET, FILM COATED ORAL at 23:22

## 2018-05-22 RX ADMIN — CLONIDINE HYDROCHLORIDE 0.1 MG: 0.1 TABLET ORAL at 20:49

## 2018-05-22 RX ADMIN — LINAGLIPTIN 5 MG: 5 TABLET, FILM COATED ORAL at 10:35

## 2018-05-22 RX ADMIN — DOCUSATE SODIUM 100 MG: 100 CAPSULE, LIQUID FILLED ORAL at 10:33

## 2018-05-22 RX ADMIN — CARVEDILOL 25 MG: 25 TABLET, FILM COATED ORAL at 10:33

## 2018-05-22 RX ADMIN — CARVEDILOL 25 MG: 25 TABLET, FILM COATED ORAL at 17:37

## 2018-05-22 RX ADMIN — HYDRALAZINE HYDROCHLORIDE 100 MG: 50 TABLET, FILM COATED ORAL at 06:36

## 2018-05-22 RX ADMIN — OXYCODONE HYDROCHLORIDE AND ACETAMINOPHEN 500 MG: 500 TABLET ORAL at 10:34

## 2018-05-22 RX ADMIN — HYDRALAZINE HYDROCHLORIDE 100 MG: 50 TABLET, FILM COATED ORAL at 10:34

## 2018-05-22 RX ADMIN — ATORVASTATIN CALCIUM 40 MG: 40 TABLET, FILM COATED ORAL at 20:49

## 2018-05-22 RX ADMIN — FEBUXOSTAT 40 MG: 40 TABLET ORAL at 10:34

## 2018-05-22 RX ADMIN — HEPARIN SODIUM 5000 UNITS: 5000 INJECTION, SOLUTION INTRAVENOUS; SUBCUTANEOUS at 15:10

## 2018-05-22 RX ADMIN — ASPIRIN 81 MG 81 MG: 81 TABLET ORAL at 10:33

## 2018-05-22 RX ADMIN — HYDRALAZINE HYDROCHLORIDE 100 MG: 50 TABLET, FILM COATED ORAL at 17:40

## 2018-05-22 RX ADMIN — HEPARIN SODIUM 5000 UNITS: 5000 INJECTION, SOLUTION INTRAVENOUS; SUBCUTANEOUS at 20:52

## 2018-05-22 RX ADMIN — SENNOSIDES 17.2 MG: 8.6 TABLET, FILM COATED ORAL at 20:49

## 2018-05-22 RX ADMIN — DOCUSATE SODIUM 100 MG: 100 CAPSULE, LIQUID FILLED ORAL at 20:49

## 2018-05-22 RX ADMIN — POLYETHYLENE GLYCOL (3350) 17 G: 17 POWDER, FOR SOLUTION ORAL at 10:33

## 2018-05-22 ASSESSMENT — PAIN DESCRIPTION - PAIN TYPE
TYPE: SURGICAL PAIN
TYPE: SURGICAL PAIN

## 2018-05-22 ASSESSMENT — PAIN SCALES - GENERAL
PAINLEVEL_OUTOF10: 1
PAINLEVEL_OUTOF10: 3
PAINLEVEL_OUTOF10: 4
PAINLEVEL_OUTOF10: 4
PAINLEVEL_OUTOF10: 5

## 2018-05-22 ASSESSMENT — PAIN DESCRIPTION - LOCATION
LOCATION: BACK
LOCATION: BACK

## 2018-05-23 VITALS
SYSTOLIC BLOOD PRESSURE: 144 MMHG | BODY MASS INDEX: 30.96 KG/M2 | DIASTOLIC BLOOD PRESSURE: 53 MMHG | OXYGEN SATURATION: 99 % | WEIGHT: 209 LBS | RESPIRATION RATE: 16 BRPM | TEMPERATURE: 98.4 F | HEIGHT: 69 IN | HEART RATE: 60 BPM

## 2018-05-23 LAB
ABSOLUTE EOS #: 0.3 K/UL (ref 0–0.4)
ABSOLUTE IMMATURE GRANULOCYTE: ABNORMAL K/UL (ref 0–0.3)
ABSOLUTE LYMPH #: 1.3 K/UL (ref 1–4.8)
ABSOLUTE MONO #: 0.6 K/UL (ref 0.1–1.3)
ANION GAP SERPL CALCULATED.3IONS-SCNC: 10 MMOL/L (ref 9–17)
BASOPHILS # BLD: 1 % (ref 0–2)
BASOPHILS ABSOLUTE: 0 K/UL (ref 0–0.2)
BUN BLDV-MCNC: 22 MG/DL (ref 8–23)
BUN/CREAT BLD: ABNORMAL (ref 9–20)
CALCIUM SERPL-MCNC: 8.5 MG/DL (ref 8.6–10.4)
CHLORIDE BLD-SCNC: 105 MMOL/L (ref 98–107)
CO2: 26 MMOL/L (ref 20–31)
CREAT SERPL-MCNC: 1.54 MG/DL (ref 0.7–1.2)
DIFFERENTIAL TYPE: ABNORMAL
EOSINOPHILS RELATIVE PERCENT: 5 % (ref 0–4)
GFR AFRICAN AMERICAN: 54 ML/MIN
GFR NON-AFRICAN AMERICAN: 44 ML/MIN
GFR SERPL CREATININE-BSD FRML MDRD: ABNORMAL ML/MIN/{1.73_M2}
GFR SERPL CREATININE-BSD FRML MDRD: ABNORMAL ML/MIN/{1.73_M2}
GLUCOSE BLD-MCNC: 115 MG/DL (ref 70–99)
GLUCOSE BLD-MCNC: 119 MG/DL (ref 75–110)
GLUCOSE BLD-MCNC: 119 MG/DL (ref 75–110)
HCT VFR BLD CALC: 27.8 % (ref 41–53)
HEMOGLOBIN: 9.2 G/DL (ref 13.5–17.5)
IMMATURE GRANULOCYTES: ABNORMAL %
LYMPHOCYTES # BLD: 25 % (ref 24–44)
MAGNESIUM: 2.4 MG/DL (ref 1.6–2.6)
MCH RBC QN AUTO: 31.6 PG (ref 26–34)
MCHC RBC AUTO-ENTMCNC: 33.2 G/DL (ref 31–37)
MCV RBC AUTO: 95.3 FL (ref 80–100)
MONOCYTES # BLD: 11 % (ref 1–7)
NRBC AUTOMATED: ABNORMAL PER 100 WBC
PDW BLD-RTO: 14.8 % (ref 11.5–14.9)
PHOSPHORUS: 3.1 MG/DL (ref 2.5–4.5)
PLATELET # BLD: 337 K/UL (ref 150–450)
PLATELET ESTIMATE: ABNORMAL
PMV BLD AUTO: 7.6 FL (ref 6–12)
POTASSIUM SERPL-SCNC: 4.1 MMOL/L (ref 3.7–5.3)
RBC # BLD: 2.92 M/UL (ref 4.5–5.9)
RBC # BLD: ABNORMAL 10*6/UL
SEG NEUTROPHILS: 58 % (ref 36–66)
SEGMENTED NEUTROPHILS ABSOLUTE COUNT: 3.1 K/UL (ref 1.3–9.1)
SODIUM BLD-SCNC: 141 MMOL/L (ref 135–144)
WBC # BLD: 5.4 K/UL (ref 3.5–11)
WBC # BLD: ABNORMAL 10*3/UL

## 2018-05-23 PROCEDURE — 97530 THERAPEUTIC ACTIVITIES: CPT

## 2018-05-23 PROCEDURE — 97535 SELF CARE MNGMENT TRAINING: CPT

## 2018-05-23 PROCEDURE — 82947 ASSAY GLUCOSE BLOOD QUANT: CPT

## 2018-05-23 PROCEDURE — 97110 THERAPEUTIC EXERCISES: CPT

## 2018-05-23 PROCEDURE — 6360000002 HC RX W HCPCS: Performed by: PHYSICAL MEDICINE & REHABILITATION

## 2018-05-23 PROCEDURE — 6370000000 HC RX 637 (ALT 250 FOR IP): Performed by: INTERNAL MEDICINE

## 2018-05-23 PROCEDURE — 99239 HOSP IP/OBS DSCHRG MGMT >30: CPT | Performed by: PHYSICAL MEDICINE & REHABILITATION

## 2018-05-23 PROCEDURE — 6370000000 HC RX 637 (ALT 250 FOR IP): Performed by: PHYSICAL MEDICINE & REHABILITATION

## 2018-05-23 PROCEDURE — 6370000000 HC RX 637 (ALT 250 FOR IP): Performed by: NURSE PRACTITIONER

## 2018-05-23 PROCEDURE — 97150 GROUP THERAPEUTIC PROCEDURES: CPT

## 2018-05-23 PROCEDURE — 84100 ASSAY OF PHOSPHORUS: CPT

## 2018-05-23 PROCEDURE — 83735 ASSAY OF MAGNESIUM: CPT

## 2018-05-23 PROCEDURE — 85025 COMPLETE CBC W/AUTO DIFF WBC: CPT

## 2018-05-23 PROCEDURE — 36415 COLL VENOUS BLD VENIPUNCTURE: CPT

## 2018-05-23 PROCEDURE — 80048 BASIC METABOLIC PNL TOTAL CA: CPT

## 2018-05-23 PROCEDURE — 97116 GAIT TRAINING THERAPY: CPT

## 2018-05-23 RX ORDER — LISINOPRIL 20 MG/1
20 TABLET ORAL DAILY
Qty: 30 TABLET | Refills: 3 | Status: SHIPPED | OUTPATIENT
Start: 2018-05-24

## 2018-05-23 RX ORDER — LISINOPRIL 20 MG/1
20 TABLET ORAL DAILY
Status: DISCONTINUED | OUTPATIENT
Start: 2018-05-24 | End: 2018-05-23 | Stop reason: HOSPADM

## 2018-05-23 RX ORDER — HYDRALAZINE HYDROCHLORIDE 100 MG/1
100 TABLET, FILM COATED ORAL 4 TIMES DAILY
Qty: 120 TABLET | Refills: 0 | Status: SHIPPED | OUTPATIENT
Start: 2018-05-23

## 2018-05-23 RX ORDER — CLONIDINE HYDROCHLORIDE 0.1 MG/1
0.1 TABLET ORAL 2 TIMES DAILY
Qty: 60 TABLET | Refills: 3 | Status: SHIPPED | OUTPATIENT
Start: 2018-05-23

## 2018-05-23 RX ORDER — FUROSEMIDE 40 MG/1
40 TABLET ORAL DAILY PRN
Status: DISCONTINUED | OUTPATIENT
Start: 2018-05-23 | End: 2018-05-23 | Stop reason: HOSPADM

## 2018-05-23 RX ORDER — FUROSEMIDE 40 MG/1
40 TABLET ORAL DAILY PRN
Qty: 30 TABLET | Refills: 0 | Status: SHIPPED | OUTPATIENT
Start: 2018-05-23

## 2018-05-23 RX ADMIN — HYDRALAZINE HYDROCHLORIDE 100 MG: 50 TABLET, FILM COATED ORAL at 05:51

## 2018-05-23 RX ADMIN — POLYETHYLENE GLYCOL (3350) 17 G: 17 POWDER, FOR SOLUTION ORAL at 07:19

## 2018-05-23 RX ADMIN — OXYCODONE HYDROCHLORIDE 5 MG: 5 TABLET ORAL at 00:55

## 2018-05-23 RX ADMIN — ASPIRIN 81 MG 81 MG: 81 TABLET ORAL at 07:19

## 2018-05-23 RX ADMIN — OXYCODONE HYDROCHLORIDE 5 MG: 5 TABLET ORAL at 07:19

## 2018-05-23 RX ADMIN — OXYCODONE HYDROCHLORIDE AND ACETAMINOPHEN 250 MG: 500 TABLET ORAL at 07:20

## 2018-05-23 RX ADMIN — DOCUSATE SODIUM 100 MG: 100 CAPSULE, LIQUID FILLED ORAL at 07:18

## 2018-05-23 RX ADMIN — CLONIDINE HYDROCHLORIDE 0.1 MG: 0.1 TABLET ORAL at 07:20

## 2018-05-23 RX ADMIN — CARVEDILOL 25 MG: 25 TABLET, FILM COATED ORAL at 07:19

## 2018-05-23 RX ADMIN — LINAGLIPTIN 5 MG: 5 TABLET, FILM COATED ORAL at 07:20

## 2018-05-23 RX ADMIN — HYDRALAZINE HYDROCHLORIDE 100 MG: 50 TABLET, FILM COATED ORAL at 11:20

## 2018-05-23 RX ADMIN — FEBUXOSTAT 40 MG: 40 TABLET ORAL at 07:21

## 2018-05-23 RX ADMIN — MULTIPLE VITAMINS W/ MINERALS TAB 1 TABLET: TAB at 07:18

## 2018-05-23 RX ADMIN — HEPARIN SODIUM 5000 UNITS: 5000 INJECTION, SOLUTION INTRAVENOUS; SUBCUTANEOUS at 07:21

## 2018-05-23 ASSESSMENT — PAIN SCALES - GENERAL
PAINLEVEL_OUTOF10: 5
PAINLEVEL_OUTOF10: 4
PAINLEVEL_OUTOF10: 3

## 2018-05-23 ASSESSMENT — PAIN DESCRIPTION - LOCATION
LOCATION: FOOT
LOCATION: FOOT

## 2018-05-23 ASSESSMENT — PAIN DESCRIPTION - ORIENTATION
ORIENTATION: RIGHT;LEFT
ORIENTATION: LEFT;RIGHT

## 2018-05-23 ASSESSMENT — PAIN DESCRIPTION - PAIN TYPE: TYPE: NEUROPATHIC PAIN

## 2019-09-20 ENCOUNTER — HOSPITAL ENCOUNTER (OUTPATIENT)
Age: 79
Setting detail: SPECIMEN
Discharge: HOME | End: 2019-09-20
Payer: MEDICARE

## 2019-09-26 LAB — SURGICAL PATHOLOGY REPORT: NORMAL
